# Patient Record
Sex: MALE | Race: WHITE
[De-identification: names, ages, dates, MRNs, and addresses within clinical notes are randomized per-mention and may not be internally consistent; named-entity substitution may affect disease eponyms.]

---

## 2021-02-04 ENCOUNTER — HOSPITAL ENCOUNTER (INPATIENT)
Dept: HOSPITAL 12 - ER | Age: 86
LOS: 12 days | Discharge: NURSING, REHAB OR LONG-TERM CARE | DRG: 885 | End: 2021-02-16
Payer: MEDICARE

## 2021-02-04 VITALS — DIASTOLIC BLOOD PRESSURE: 71 MMHG | SYSTOLIC BLOOD PRESSURE: 174 MMHG

## 2021-02-04 VITALS — DIASTOLIC BLOOD PRESSURE: 84 MMHG | SYSTOLIC BLOOD PRESSURE: 167 MMHG

## 2021-02-04 VITALS — WEIGHT: 156 LBS | HEIGHT: 67 IN | BODY MASS INDEX: 24.48 KG/M2

## 2021-02-04 DIAGNOSIS — F02.81: ICD-10-CM

## 2021-02-04 DIAGNOSIS — F01.50: ICD-10-CM

## 2021-02-04 DIAGNOSIS — E11.22: ICD-10-CM

## 2021-02-04 DIAGNOSIS — N13.9: ICD-10-CM

## 2021-02-04 DIAGNOSIS — Z79.4: ICD-10-CM

## 2021-02-04 DIAGNOSIS — N40.1: ICD-10-CM

## 2021-02-04 DIAGNOSIS — N18.9: ICD-10-CM

## 2021-02-04 DIAGNOSIS — E11.65: ICD-10-CM
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
## 2021-02-04 DIAGNOSIS — Z20.822: ICD-10-CM

## 2021-02-04 DIAGNOSIS — G31.09: ICD-10-CM

## 2021-02-04 DIAGNOSIS — G93.41: ICD-10-CM

## 2021-02-04 DIAGNOSIS — Z73.6: ICD-10-CM

## 2021-02-04 DIAGNOSIS — N39.0: ICD-10-CM

## 2021-02-04 DIAGNOSIS — M89.9: ICD-10-CM

## 2021-02-04 DIAGNOSIS — F23: ICD-10-CM

## 2021-02-04 DIAGNOSIS — G93.40: ICD-10-CM

## 2021-02-04 DIAGNOSIS — N17.0: ICD-10-CM

## 2021-02-04 DIAGNOSIS — F33.3: Primary | ICD-10-CM

## 2021-02-04 DIAGNOSIS — I12.9: ICD-10-CM

## 2021-02-04 DIAGNOSIS — F29: ICD-10-CM

## 2021-02-04 DIAGNOSIS — E78.5: ICD-10-CM

## 2021-02-04 DIAGNOSIS — Z79.890: ICD-10-CM

## 2021-02-04 DIAGNOSIS — S61.205D: ICD-10-CM

## 2021-02-04 DIAGNOSIS — E11.40: ICD-10-CM

## 2021-02-04 DIAGNOSIS — D64.9: ICD-10-CM

## 2021-02-04 DIAGNOSIS — E03.9: ICD-10-CM

## 2021-02-04 RX ADMIN — LORAZEPAM PRN MG: 0.5 TABLET ORAL at 20:19

## 2021-02-04 RX ADMIN — ATORVASTATIN CALCIUM SCH MG: 40 TABLET, FILM COATED ORAL at 20:19

## 2021-02-05 VITALS — DIASTOLIC BLOOD PRESSURE: 56 MMHG | SYSTOLIC BLOOD PRESSURE: 125 MMHG

## 2021-02-05 VITALS — SYSTOLIC BLOOD PRESSURE: 121 MMHG | DIASTOLIC BLOOD PRESSURE: 56 MMHG

## 2021-02-05 VITALS — DIASTOLIC BLOOD PRESSURE: 50 MMHG | SYSTOLIC BLOOD PRESSURE: 147 MMHG

## 2021-02-05 LAB
ALP SERPL-CCNC: 55 U/L (ref 50–136)
ALT SERPL W/O P-5'-P-CCNC: 26 U/L (ref 16–63)
AST SERPL-CCNC: 20 U/L (ref 15–37)
BILIRUB SERPL-MCNC: 0.5 MG/DL (ref 0.2–1)
BUN SERPL-MCNC: 49 MG/DL (ref 7–18)
CHLORIDE SERPL-SCNC: 107 MMOL/L (ref 98–107)
CHOLEST SERPL-MCNC: 99 MG/DL (ref ?–200)
CO2 SERPL-SCNC: 27 MMOL/L (ref 21–32)
CREAT SERPL-MCNC: 2.4 MG/DL (ref 0.6–1.3)
GLUCOSE SERPL-MCNC: 156 MG/DL (ref 74–106)
HDLC SERPL-MCNC: 44 MG/DL (ref 40–60)
POTASSIUM SERPL-SCNC: 3.6 MMOL/L (ref 3.5–5.1)
TRIGL SERPL-MCNC: 159 MG/DL (ref 30–150)
WS STN SPEC: 6.5 G/DL (ref 6.4–8.2)

## 2021-02-05 RX ADMIN — ATORVASTATIN CALCIUM SCH MG: 40 TABLET, FILM COATED ORAL at 20:13

## 2021-02-05 RX ADMIN — LEVOTHYROXINE SODIUM SCH MCG: 25 TABLET ORAL at 08:25

## 2021-02-05 RX ADMIN — TAMSULOSIN HYDROCHLORIDE SCH MG: 0.4 CAPSULE ORAL at 20:13

## 2021-02-05 RX ADMIN — FUROSEMIDE SCH MG: 40 TABLET ORAL at 08:24

## 2021-02-05 RX ADMIN — TRAZODONE HYDROCHLORIDE SCH MG: 50 TABLET ORAL at 20:13

## 2021-02-05 RX ADMIN — MEMANTINE HYDROCHLORIDE SCH MG: 10 TABLET ORAL at 08:24

## 2021-02-05 RX ADMIN — GABAPENTIN SCH MG: 400 CAPSULE ORAL at 08:25

## 2021-02-05 RX ADMIN — GABAPENTIN SCH MG: 400 CAPSULE ORAL at 17:00

## 2021-02-05 RX ADMIN — METOPROLOL SUCCINATE SCH MG: 50 TABLET, FILM COATED, EXTENDED RELEASE ORAL at 08:26

## 2021-02-06 VITALS — SYSTOLIC BLOOD PRESSURE: 158 MMHG | DIASTOLIC BLOOD PRESSURE: 74 MMHG

## 2021-02-06 VITALS — DIASTOLIC BLOOD PRESSURE: 48 MMHG | SYSTOLIC BLOOD PRESSURE: 126 MMHG

## 2021-02-06 VITALS — SYSTOLIC BLOOD PRESSURE: 114 MMHG | DIASTOLIC BLOOD PRESSURE: 46 MMHG

## 2021-02-06 RX ADMIN — MEMANTINE HYDROCHLORIDE SCH MG: 10 TABLET ORAL at 08:55

## 2021-02-06 RX ADMIN — TRAZODONE HYDROCHLORIDE SCH MG: 50 TABLET ORAL at 20:02

## 2021-02-06 RX ADMIN — TAMSULOSIN HYDROCHLORIDE SCH MG: 0.4 CAPSULE ORAL at 20:02

## 2021-02-06 RX ADMIN — GABAPENTIN SCH MG: 400 CAPSULE ORAL at 08:51

## 2021-02-06 RX ADMIN — CLOPIDOGREL BISULFATE SCH MG: 75 TABLET ORAL at 12:37

## 2021-02-06 RX ADMIN — FUROSEMIDE SCH MG: 40 TABLET ORAL at 08:51

## 2021-02-06 RX ADMIN — LORAZEPAM PRN MG: 0.5 TABLET ORAL at 20:02

## 2021-02-06 RX ADMIN — DONEPEZIL HYDROCHLORIDE SCH MG: 10 TABLET, FILM COATED ORAL at 12:37

## 2021-02-06 RX ADMIN — GABAPENTIN SCH MG: 400 CAPSULE ORAL at 16:55

## 2021-02-06 RX ADMIN — LEVOTHYROXINE SODIUM SCH MCG: 25 TABLET ORAL at 10:05

## 2021-02-06 RX ADMIN — METOPROLOL SUCCINATE SCH MG: 50 TABLET, FILM COATED, EXTENDED RELEASE ORAL at 08:51

## 2021-02-06 RX ADMIN — ATORVASTATIN CALCIUM SCH MG: 40 TABLET, FILM COATED ORAL at 20:01

## 2021-02-06 RX ADMIN — LORAZEPAM PRN MG: 0.5 TABLET ORAL at 12:37

## 2021-02-07 VITALS — SYSTOLIC BLOOD PRESSURE: 156 MMHG | DIASTOLIC BLOOD PRESSURE: 59 MMHG

## 2021-02-07 VITALS — DIASTOLIC BLOOD PRESSURE: 46 MMHG | SYSTOLIC BLOOD PRESSURE: 144 MMHG

## 2021-02-07 VITALS — DIASTOLIC BLOOD PRESSURE: 53 MMHG | SYSTOLIC BLOOD PRESSURE: 153 MMHG

## 2021-02-07 LAB
ALP SERPL-CCNC: 50 U/L (ref 50–136)
ALT SERPL W/O P-5'-P-CCNC: 19 U/L (ref 16–63)
APPEARANCE UR: CLEAR
AST SERPL-CCNC: 15 U/L (ref 15–37)
BASOPHILS # BLD AUTO: 0 K/UL (ref 0–8)
BASOPHILS NFR BLD AUTO: 0.3 % (ref 0–2)
BILIRUB SERPL-MCNC: 0.4 MG/DL (ref 0.2–1)
BILIRUB UR QL STRIP: NEGATIVE
BUN SERPL-MCNC: 71 MG/DL (ref 7–18)
CHLORIDE SERPL-SCNC: 104 MMOL/L (ref 98–107)
CO2 SERPL-SCNC: 24 MMOL/L (ref 21–32)
COLOR UR: YELLOW
CREAT SERPL-MCNC: 3.6 MG/DL (ref 0.6–1.3)
CREAT UR-MCNC: 113.1 MG/DL (ref 30–125)
EOSINOPHIL # BLD AUTO: 0.1 K/UL (ref 0–0.7)
EOSINOPHIL NFR BLD AUTO: 2.4 % (ref 0–7)
GLUCOSE SERPL-MCNC: 192 MG/DL (ref 74–106)
GLUCOSE UR STRIP-MCNC: NEGATIVE MG/DL
HCT VFR BLD AUTO: 27.9 % (ref 36.7–47.1)
HGB BLD-MCNC: 9.7 G/DL (ref 12.5–16.3)
HGB UR QL STRIP: NEGATIVE
KETONES UR STRIP-MCNC: NEGATIVE MG/DL
LEUKOCYTE ESTERASE UR QL STRIP: NEGATIVE
LYMPHOCYTES # BLD AUTO: 1 K/UL (ref 20–40)
LYMPHOCYTES NFR BLD AUTO: 20.1 % (ref 20.5–51.5)
MAGNESIUM SERPL-MCNC: 2.3 MG/DL (ref 1.8–2.4)
MCH RBC QN AUTO: 36 UUG (ref 23.8–33.4)
MCHC RBC AUTO-ENTMCNC: 35 G/DL (ref 32.5–36.3)
MCV RBC AUTO: 103.9 FL (ref 73–96.2)
MONOCYTES # BLD AUTO: 0.5 K/UL (ref 2–10)
MONOCYTES NFR BLD AUTO: 10 % (ref 0–11)
NEUTROPHILS # BLD AUTO: 3.5 K/UL (ref 1.8–8.9)
NEUTROPHILS NFR BLD AUTO: 67.2 % (ref 38.5–71.5)
NITRITE UR QL STRIP: NEGATIVE
PH UR STRIP: 5 [PH] (ref 5–8)
PHOSPHATE SERPL-MCNC: 4.5 MG/DL (ref 2.5–4.9)
PLATELET # BLD AUTO: 141 K/UL (ref 152–348)
POTASSIUM SERPL-SCNC: 4.9 MMOL/L (ref 3.5–5.1)
PROT UR-MCNC: 60.5 MG/DL
RBC # BLD AUTO: 2.68 MIL/UL (ref 4.06–5.63)
SP GR UR STRIP: 1.02 (ref 1–1.03)
UROBILINOGEN UR STRIP-MCNC: 0.2 E.U./DL
WBC # BLD AUTO: 5.2 K/UL (ref 3.6–10.2)
WS STN SPEC: 5.8 G/DL (ref 6.4–8.2)

## 2021-02-07 RX ADMIN — Medication SCH EACH: at 11:30

## 2021-02-07 RX ADMIN — ATORVASTATIN CALCIUM SCH MG: 40 TABLET, FILM COATED ORAL at 20:40

## 2021-02-07 RX ADMIN — TRAZODONE HYDROCHLORIDE SCH MG: 50 TABLET ORAL at 20:40

## 2021-02-07 RX ADMIN — Medication SCH EACH: at 20:07

## 2021-02-07 RX ADMIN — GABAPENTIN SCH MG: 400 CAPSULE ORAL at 17:29

## 2021-02-07 RX ADMIN — ANORECTAL OINTMENT SCH GM: 15.7; .44; 24; 20.6 OINTMENT TOPICAL at 11:59

## 2021-02-07 RX ADMIN — GABAPENTIN SCH MG: 400 CAPSULE ORAL at 11:55

## 2021-02-07 RX ADMIN — INSULIN GLARGINE SCH UNITS: 100 INJECTION, SOLUTION SUBCUTANEOUS at 20:10

## 2021-02-07 RX ADMIN — FUROSEMIDE SCH MG: 40 TABLET ORAL at 11:53

## 2021-02-07 RX ADMIN — SODIUM CHLORIDE PRN UNIT: 9 INJECTION, SOLUTION INTRAVENOUS at 20:12

## 2021-02-07 RX ADMIN — LEVOTHYROXINE SODIUM SCH MCG: 25 TABLET ORAL at 11:58

## 2021-02-07 RX ADMIN — Medication SCH EACH: at 16:37

## 2021-02-07 RX ADMIN — METOPROLOL SUCCINATE SCH MG: 50 TABLET, FILM COATED, EXTENDED RELEASE ORAL at 11:55

## 2021-02-07 RX ADMIN — ANORECTAL OINTMENT SCH GM: 15.7; .44; 24; 20.6 OINTMENT TOPICAL at 20:40

## 2021-02-07 RX ADMIN — CLOPIDOGREL BISULFATE SCH MG: 75 TABLET ORAL at 11:56

## 2021-02-07 RX ADMIN — TAMSULOSIN HYDROCHLORIDE SCH MG: 0.4 CAPSULE ORAL at 20:40

## 2021-02-07 RX ADMIN — MEMANTINE HYDROCHLORIDE SCH MG: 5 TABLET ORAL at 17:29

## 2021-02-07 RX ADMIN — MEMANTINE HYDROCHLORIDE SCH MG: 5 TABLET ORAL at 11:57

## 2021-02-07 RX ADMIN — DONEPEZIL HYDROCHLORIDE SCH MG: 10 TABLET, FILM COATED ORAL at 11:54

## 2021-02-07 RX ADMIN — LORAZEPAM PRN MG: 0.5 TABLET ORAL at 20:42

## 2021-02-07 RX ADMIN — SODIUM CHLORIDE PRN UNIT: 9 INJECTION, SOLUTION INTRAVENOUS at 16:35

## 2021-02-07 RX ADMIN — Medication SCH EACH: at 09:30

## 2021-02-08 VITALS — DIASTOLIC BLOOD PRESSURE: 50 MMHG | SYSTOLIC BLOOD PRESSURE: 149 MMHG

## 2021-02-08 VITALS — SYSTOLIC BLOOD PRESSURE: 159 MMHG | DIASTOLIC BLOOD PRESSURE: 46 MMHG

## 2021-02-08 VITALS — SYSTOLIC BLOOD PRESSURE: 147 MMHG | DIASTOLIC BLOOD PRESSURE: 62 MMHG

## 2021-02-08 RX ADMIN — TAMSULOSIN HYDROCHLORIDE SCH MG: 0.4 CAPSULE ORAL at 20:08

## 2021-02-08 RX ADMIN — LEVOTHYROXINE SODIUM SCH MCG: 25 TABLET ORAL at 09:44

## 2021-02-08 RX ADMIN — TRAZODONE HYDROCHLORIDE SCH MG: 50 TABLET ORAL at 20:08

## 2021-02-08 RX ADMIN — METOPROLOL SUCCINATE SCH MG: 50 TABLET, FILM COATED, EXTENDED RELEASE ORAL at 09:44

## 2021-02-08 RX ADMIN — ANORECTAL OINTMENT SCH GM: 15.7; .44; 24; 20.6 OINTMENT TOPICAL at 09:45

## 2021-02-08 RX ADMIN — FUROSEMIDE SCH MG: 40 TABLET ORAL at 09:44

## 2021-02-08 RX ADMIN — Medication SCH EACH: at 20:12

## 2021-02-08 RX ADMIN — MEMANTINE HYDROCHLORIDE SCH MG: 5 TABLET ORAL at 09:44

## 2021-02-08 RX ADMIN — SODIUM CHLORIDE PRN UNIT: 9 INJECTION, SOLUTION INTRAVENOUS at 20:16

## 2021-02-08 RX ADMIN — Medication SCH EACH: at 11:59

## 2021-02-08 RX ADMIN — INSULIN GLARGINE SCH UNITS: 100 INJECTION, SOLUTION SUBCUTANEOUS at 20:17

## 2021-02-08 RX ADMIN — SODIUM CHLORIDE PRN UNIT: 9 INJECTION, SOLUTION INTRAVENOUS at 17:08

## 2021-02-08 RX ADMIN — Medication SCH EACH: at 06:07

## 2021-02-08 RX ADMIN — ATORVASTATIN CALCIUM SCH MG: 40 TABLET, FILM COATED ORAL at 20:12

## 2021-02-08 RX ADMIN — Medication SCH EACH: at 16:41

## 2021-02-08 RX ADMIN — LORAZEPAM PRN MG: 0.5 TABLET ORAL at 21:51

## 2021-02-08 RX ADMIN — MEMANTINE HYDROCHLORIDE SCH MG: 5 TABLET ORAL at 17:07

## 2021-02-08 RX ADMIN — SODIUM CHLORIDE PRN UNIT: 9 INJECTION, SOLUTION INTRAVENOUS at 12:18

## 2021-02-08 RX ADMIN — ANORECTAL OINTMENT SCH GM: 15.7; .44; 24; 20.6 OINTMENT TOPICAL at 20:08

## 2021-02-08 RX ADMIN — DONEPEZIL HYDROCHLORIDE SCH MG: 10 TABLET, FILM COATED ORAL at 09:44

## 2021-02-08 RX ADMIN — GABAPENTIN SCH MG: 400 CAPSULE ORAL at 17:07

## 2021-02-08 RX ADMIN — CLOPIDOGREL BISULFATE SCH MG: 75 TABLET ORAL at 09:44

## 2021-02-08 RX ADMIN — GABAPENTIN SCH MG: 400 CAPSULE ORAL at 09:44

## 2021-02-09 VITALS — DIASTOLIC BLOOD PRESSURE: 52 MMHG | SYSTOLIC BLOOD PRESSURE: 119 MMHG

## 2021-02-09 VITALS — DIASTOLIC BLOOD PRESSURE: 48 MMHG | SYSTOLIC BLOOD PRESSURE: 122 MMHG

## 2021-02-09 LAB
BASOPHILS # BLD AUTO: 0 K/UL (ref 0–8)
BASOPHILS NFR BLD AUTO: 0.4 % (ref 0–2)
BUN SERPL-MCNC: 88 MG/DL (ref 7–18)
CREAT SERPL-MCNC: 3.4 MG/DL (ref 0.6–1.3)
EOSINOPHIL # BLD AUTO: 0.1 K/UL (ref 0–0.7)
EOSINOPHIL NFR BLD AUTO: 2.8 % (ref 0–7)
FERRITIN SERPL-MCNC: 211 NG/ML (ref 26–388)
HCT VFR BLD AUTO: 29.6 % (ref 36.7–47.1)
HGB BLD-MCNC: 10.1 G/DL (ref 12.5–16.3)
LDH SERPL L TO P-CCNC: 197 U/L (ref 85–227)
LYMPHOCYTES # BLD AUTO: 1 K/UL (ref 20–40)
LYMPHOCYTES NFR BLD AUTO: 20.1 % (ref 20.5–51.5)
MCH RBC QN AUTO: 35.3 UUG (ref 23.8–33.4)
MCHC RBC AUTO-ENTMCNC: 34 G/DL (ref 32.5–36.3)
MCV RBC AUTO: 102.9 FL (ref 73–96.2)
MONOCYTES # BLD AUTO: 0.5 K/UL (ref 2–10)
MONOCYTES NFR BLD AUTO: 9.8 % (ref 0–11)
NEUTROPHILS # BLD AUTO: 3.4 K/UL (ref 1.8–8.9)
NEUTROPHILS NFR BLD AUTO: 66.9 % (ref 38.5–71.5)
PLATELET # BLD AUTO: 154 K/UL (ref 152–348)
RBC # BLD AUTO: 2.88 MIL/UL (ref 4.06–5.63)
TSH SERPL DL<=0.005 MIU/L-ACNC: 1.75 MIU/ML (ref 0.36–3.74)
WBC # BLD AUTO: 5 K/UL (ref 3.6–10.2)

## 2021-02-09 RX ADMIN — DONEPEZIL HYDROCHLORIDE SCH MG: 10 TABLET, FILM COATED ORAL at 09:41

## 2021-02-09 RX ADMIN — ANORECTAL OINTMENT SCH GM: 15.7; .44; 24; 20.6 OINTMENT TOPICAL at 09:45

## 2021-02-09 RX ADMIN — CLOPIDOGREL BISULFATE SCH MG: 75 TABLET ORAL at 09:42

## 2021-02-09 RX ADMIN — GABAPENTIN SCH MG: 400 CAPSULE ORAL at 17:15

## 2021-02-09 RX ADMIN — Medication SCH EACH: at 20:36

## 2021-02-09 RX ADMIN — SODIUM CHLORIDE PRN UNIT: 9 INJECTION, SOLUTION INTRAVENOUS at 08:12

## 2021-02-09 RX ADMIN — Medication SCH EACH: at 11:58

## 2021-02-09 RX ADMIN — ANORECTAL OINTMENT SCH GM: 15.7; .44; 24; 20.6 OINTMENT TOPICAL at 20:37

## 2021-02-09 RX ADMIN — SODIUM CHLORIDE PRN UNIT: 9 INJECTION, SOLUTION INTRAVENOUS at 17:34

## 2021-02-09 RX ADMIN — Medication SCH EACH: at 16:49

## 2021-02-09 RX ADMIN — MEMANTINE HYDROCHLORIDE SCH MG: 5 TABLET ORAL at 17:15

## 2021-02-09 RX ADMIN — GABAPENTIN SCH MG: 400 CAPSULE ORAL at 09:41

## 2021-02-09 RX ADMIN — MEMANTINE HYDROCHLORIDE SCH MG: 5 TABLET ORAL at 09:41

## 2021-02-09 RX ADMIN — INSULIN GLARGINE SCH UNITS: 100 INJECTION, SOLUTION SUBCUTANEOUS at 20:55

## 2021-02-09 RX ADMIN — LORAZEPAM PRN MG: 0.5 TABLET ORAL at 20:53

## 2021-02-09 RX ADMIN — METOPROLOL SUCCINATE SCH MG: 50 TABLET, FILM COATED, EXTENDED RELEASE ORAL at 09:42

## 2021-02-09 RX ADMIN — CLOTRIMAZOLE SCH GM: 1 CREAM TOPICAL at 17:15

## 2021-02-09 RX ADMIN — SODIUM CHLORIDE PRN UNIT: 9 INJECTION, SOLUTION INTRAVENOUS at 20:54

## 2021-02-09 RX ADMIN — TAMSULOSIN HYDROCHLORIDE SCH MG: 0.4 CAPSULE ORAL at 20:53

## 2021-02-09 RX ADMIN — Medication SCH EACH: at 06:32

## 2021-02-09 RX ADMIN — FUROSEMIDE SCH MG: 40 TABLET ORAL at 09:41

## 2021-02-09 RX ADMIN — TRAZODONE HYDROCHLORIDE SCH MG: 50 TABLET ORAL at 20:53

## 2021-02-09 RX ADMIN — LEVOTHYROXINE SODIUM SCH MCG: 25 TABLET ORAL at 09:43

## 2021-02-09 RX ADMIN — ATORVASTATIN CALCIUM SCH MG: 40 TABLET, FILM COATED ORAL at 20:53

## 2021-02-10 VITALS — DIASTOLIC BLOOD PRESSURE: 51 MMHG | SYSTOLIC BLOOD PRESSURE: 151 MMHG

## 2021-02-10 VITALS — SYSTOLIC BLOOD PRESSURE: 168 MMHG | DIASTOLIC BLOOD PRESSURE: 54 MMHG

## 2021-02-10 VITALS — DIASTOLIC BLOOD PRESSURE: 56 MMHG | SYSTOLIC BLOOD PRESSURE: 126 MMHG

## 2021-02-10 LAB
ALP SERPL-CCNC: 55 U/L (ref 50–136)
ALT SERPL W/O P-5'-P-CCNC: 23 U/L (ref 16–63)
AST SERPL-CCNC: 23 U/L (ref 15–37)
BASOPHILS # BLD AUTO: 0 K/UL (ref 0–8)
BASOPHILS NFR BLD AUTO: 0.3 % (ref 0–2)
BILIRUB SERPL-MCNC: 0.4 MG/DL (ref 0.2–1)
BUN SERPL-MCNC: 87 MG/DL (ref 7–18)
CHLORIDE SERPL-SCNC: 103 MMOL/L (ref 98–107)
CO2 SERPL-SCNC: 25 MMOL/L (ref 21–32)
CREAT SERPL-MCNC: 2.8 MG/DL (ref 0.6–1.3)
EOSINOPHIL # BLD AUTO: 0.1 K/UL (ref 0–0.7)
EOSINOPHIL NFR BLD AUTO: 1.7 % (ref 0–7)
GLUCOSE SERPL-MCNC: 197 MG/DL (ref 74–106)
HCT VFR BLD AUTO: 32.1 % (ref 36.7–47.1)
HGB BLD-MCNC: 10.8 G/DL (ref 12.5–16.3)
LYMPHOCYTES # BLD AUTO: 1.1 K/UL (ref 20–40)
LYMPHOCYTES NFR BLD AUTO: 19.7 % (ref 20.5–51.5)
MAGNESIUM SERPL-MCNC: 2.4 MG/DL (ref 1.8–2.4)
MCH RBC QN AUTO: 34.7 UUG (ref 23.8–33.4)
MCHC RBC AUTO-ENTMCNC: 34 G/DL (ref 32.5–36.3)
MCV RBC AUTO: 102.7 FL (ref 73–96.2)
MONOCYTES # BLD AUTO: 0.6 K/UL (ref 2–10)
MONOCYTES NFR BLD AUTO: 9.8 % (ref 0–11)
NEUTROPHILS # BLD AUTO: 3.9 K/UL (ref 1.8–8.9)
NEUTROPHILS NFR BLD AUTO: 68.5 % (ref 38.5–71.5)
PLATELET # BLD AUTO: 174 K/UL (ref 152–348)
POTASSIUM SERPL-SCNC: 4.5 MMOL/L (ref 3.5–5.1)
RBC # BLD AUTO: 3.13 MIL/UL (ref 4.06–5.63)
WBC # BLD AUTO: 5.7 K/UL (ref 3.6–10.2)
WS STN SPEC: 6.5 G/DL (ref 6.4–8.2)

## 2021-02-10 RX ADMIN — CLOTRIMAZOLE SCH GM: 1 CREAM TOPICAL at 17:47

## 2021-02-10 RX ADMIN — SODIUM CHLORIDE PRN UNIT: 9 INJECTION, SOLUTION INTRAVENOUS at 20:47

## 2021-02-10 RX ADMIN — SODIUM CHLORIDE PRN UNIT: 9 INJECTION, SOLUTION INTRAVENOUS at 18:01

## 2021-02-10 RX ADMIN — GABAPENTIN SCH MG: 400 CAPSULE ORAL at 09:00

## 2021-02-10 RX ADMIN — Medication SCH EACH: at 06:43

## 2021-02-10 RX ADMIN — Medication SCH EACH: at 11:43

## 2021-02-10 RX ADMIN — Medication SCH EACH: at 20:42

## 2021-02-10 RX ADMIN — MEMANTINE HYDROCHLORIDE SCH MG: 5 TABLET ORAL at 09:00

## 2021-02-10 RX ADMIN — ANORECTAL OINTMENT SCH GM: 15.7; .44; 24; 20.6 OINTMENT TOPICAL at 10:01

## 2021-02-10 RX ADMIN — FUROSEMIDE SCH MG: 40 TABLET ORAL at 09:00

## 2021-02-10 RX ADMIN — Medication SCH EACH: at 16:52

## 2021-02-10 RX ADMIN — DONEPEZIL HYDROCHLORIDE SCH MG: 10 TABLET, FILM COATED ORAL at 09:00

## 2021-02-10 RX ADMIN — MEMANTINE HYDROCHLORIDE SCH MG: 5 TABLET ORAL at 17:24

## 2021-02-10 RX ADMIN — ANORECTAL OINTMENT SCH GM: 15.7; .44; 24; 20.6 OINTMENT TOPICAL at 20:42

## 2021-02-10 RX ADMIN — METOPROLOL SUCCINATE SCH MG: 50 TABLET, FILM COATED, EXTENDED RELEASE ORAL at 09:00

## 2021-02-10 RX ADMIN — ATORVASTATIN CALCIUM SCH MG: 40 TABLET, FILM COATED ORAL at 20:03

## 2021-02-10 RX ADMIN — LORAZEPAM PRN MG: 0.5 TABLET ORAL at 20:03

## 2021-02-10 RX ADMIN — CLOPIDOGREL BISULFATE SCH MG: 75 TABLET ORAL at 09:00

## 2021-02-10 RX ADMIN — DIVALPROEX SODIUM SCH MG: 125 CAPSULE ORAL at 17:24

## 2021-02-10 RX ADMIN — DIVALPROEX SODIUM SCH MG: 125 CAPSULE ORAL at 14:21

## 2021-02-10 RX ADMIN — INSULIN GLARGINE SCH UNITS: 100 INJECTION, SOLUTION SUBCUTANEOUS at 20:48

## 2021-02-10 RX ADMIN — TAMSULOSIN HYDROCHLORIDE SCH MG: 0.4 CAPSULE ORAL at 20:03

## 2021-02-10 RX ADMIN — LEVOTHYROXINE SODIUM SCH MCG: 25 TABLET ORAL at 09:00

## 2021-02-10 RX ADMIN — CLOTRIMAZOLE SCH GM: 1 CREAM TOPICAL at 10:01

## 2021-02-10 RX ADMIN — GABAPENTIN SCH MG: 300 CAPSULE ORAL at 20:03

## 2021-02-11 VITALS — SYSTOLIC BLOOD PRESSURE: 154 MMHG | DIASTOLIC BLOOD PRESSURE: 61 MMHG

## 2021-02-11 VITALS — DIASTOLIC BLOOD PRESSURE: 51 MMHG | SYSTOLIC BLOOD PRESSURE: 121 MMHG

## 2021-02-11 VITALS — SYSTOLIC BLOOD PRESSURE: 172 MMHG | DIASTOLIC BLOOD PRESSURE: 70 MMHG

## 2021-02-11 VITALS — SYSTOLIC BLOOD PRESSURE: 152 MMHG | DIASTOLIC BLOOD PRESSURE: 66 MMHG

## 2021-02-11 LAB
BASOPHILS # BLD AUTO: 0 K/UL (ref 0–8)
BASOPHILS NFR BLD AUTO: 0.4 % (ref 0–2)
BUN SERPL-MCNC: 79 MG/DL (ref 7–18)
CHLORIDE SERPL-SCNC: 106 MMOL/L (ref 98–107)
CO2 SERPL-SCNC: 28 MMOL/L (ref 21–32)
CREAT SERPL-MCNC: 2.2 MG/DL (ref 0.6–1.3)
EOSINOPHIL # BLD AUTO: 0.1 K/UL (ref 0–0.7)
EOSINOPHIL NFR BLD AUTO: 1.9 % (ref 0–7)
GLUCOSE SERPL-MCNC: 117 MG/DL (ref 74–106)
HCT VFR BLD AUTO: 30.9 % (ref 36.7–47.1)
HGB BLD-MCNC: 10.8 G/DL (ref 12.5–16.3)
LYMPHOCYTES # BLD AUTO: 0.2 K/UL (ref 20–40)
LYMPHOCYTES NFR BLD AUTO: 2.9 % (ref 20.5–51.5)
MAGNESIUM SERPL-MCNC: 2.2 MG/DL (ref 1.8–2.4)
MCH RBC QN AUTO: 36 UUG (ref 23.8–33.4)
MCHC RBC AUTO-ENTMCNC: 35 G/DL (ref 32.5–36.3)
MCV RBC AUTO: 102.8 FL (ref 73–96.2)
MONOCYTES # BLD AUTO: 0.2 K/UL (ref 2–10)
MONOCYTES NFR BLD AUTO: 2.9 % (ref 0–11)
NEUTROPHILS # BLD AUTO: 4.8 K/UL (ref 1.8–8.9)
NEUTROPHILS NFR BLD AUTO: 91.9 % (ref 38.5–71.5)
PHOSPHATE SERPL-MCNC: 4.2 MG/DL (ref 2.5–4.9)
PLATELET # BLD AUTO: 180 K/UL (ref 152–348)
POTASSIUM SERPL-SCNC: 5.9 MMOL/L (ref 3.5–5.1)
RBC # BLD AUTO: 3 MIL/UL (ref 4.06–5.63)
WBC # BLD AUTO: 5.2 K/UL (ref 3.6–10.2)

## 2021-02-11 RX ADMIN — Medication SCH EACH: at 06:54

## 2021-02-11 RX ADMIN — MEMANTINE HYDROCHLORIDE SCH MG: 5 TABLET ORAL at 09:18

## 2021-02-11 RX ADMIN — DIVALPROEX SODIUM SCH MG: 125 CAPSULE ORAL at 13:46

## 2021-02-11 RX ADMIN — ATORVASTATIN CALCIUM SCH MG: 40 TABLET, FILM COATED ORAL at 09:18

## 2021-02-11 RX ADMIN — SODIUM CHLORIDE PRN UNIT: 9 INJECTION, SOLUTION INTRAVENOUS at 12:31

## 2021-02-11 RX ADMIN — ANORECTAL OINTMENT SCH GM: 15.7; .44; 24; 20.6 OINTMENT TOPICAL at 20:49

## 2021-02-11 RX ADMIN — CLOTRIMAZOLE SCH GM: 1 CREAM TOPICAL at 09:20

## 2021-02-11 RX ADMIN — INSULIN GLARGINE SCH UNITS: 100 INJECTION, SOLUTION SUBCUTANEOUS at 21:06

## 2021-02-11 RX ADMIN — DIVALPROEX SODIUM SCH MG: 125 CAPSULE ORAL at 09:18

## 2021-02-11 RX ADMIN — ANORECTAL OINTMENT SCH GM: 15.7; .44; 24; 20.6 OINTMENT TOPICAL at 09:20

## 2021-02-11 RX ADMIN — GABAPENTIN SCH MG: 100 CAPSULE ORAL at 17:10

## 2021-02-11 RX ADMIN — MEGESTROL ACETATE SCH MG: 20 TABLET ORAL at 17:10

## 2021-02-11 RX ADMIN — MEMANTINE HYDROCHLORIDE SCH MG: 5 TABLET ORAL at 17:10

## 2021-02-11 RX ADMIN — DONEPEZIL HYDROCHLORIDE SCH MG: 10 TABLET, FILM COATED ORAL at 09:17

## 2021-02-11 RX ADMIN — Medication SCH EACH: at 12:03

## 2021-02-11 RX ADMIN — Medication SCH EA: at 15:10

## 2021-02-11 RX ADMIN — Medication SCH EACH: at 21:02

## 2021-02-11 RX ADMIN — DIVALPROEX SODIUM SCH MG: 125 CAPSULE ORAL at 17:11

## 2021-02-11 RX ADMIN — TAMSULOSIN HYDROCHLORIDE SCH MG: 0.4 CAPSULE ORAL at 20:22

## 2021-02-11 RX ADMIN — GABAPENTIN SCH MG: 300 CAPSULE ORAL at 20:27

## 2021-02-11 RX ADMIN — SODIUM CHLORIDE PRN UNIT: 9 INJECTION, SOLUTION INTRAVENOUS at 21:04

## 2021-02-11 RX ADMIN — Medication SCH EACH: at 17:11

## 2021-02-11 RX ADMIN — CLOTRIMAZOLE SCH GM: 1 CREAM TOPICAL at 18:25

## 2021-02-11 RX ADMIN — FUROSEMIDE SCH MG: 40 TABLET ORAL at 09:18

## 2021-02-11 RX ADMIN — CLOPIDOGREL BISULFATE SCH MG: 75 TABLET ORAL at 09:18

## 2021-02-11 RX ADMIN — LEVOTHYROXINE SODIUM SCH MCG: 25 TABLET ORAL at 09:18

## 2021-02-11 RX ADMIN — GABAPENTIN SCH MG: 100 CAPSULE ORAL at 09:23

## 2021-02-11 RX ADMIN — SODIUM CHLORIDE PRN UNIT: 9 INJECTION, SOLUTION INTRAVENOUS at 18:28

## 2021-02-11 RX ADMIN — METOPROLOL SUCCINATE SCH MG: 50 TABLET, FILM COATED, EXTENDED RELEASE ORAL at 09:17

## 2021-02-12 VITALS — SYSTOLIC BLOOD PRESSURE: 119 MMHG | DIASTOLIC BLOOD PRESSURE: 49 MMHG

## 2021-02-12 VITALS — DIASTOLIC BLOOD PRESSURE: 57 MMHG | SYSTOLIC BLOOD PRESSURE: 135 MMHG

## 2021-02-12 VITALS — DIASTOLIC BLOOD PRESSURE: 60 MMHG | SYSTOLIC BLOOD PRESSURE: 114 MMHG

## 2021-02-12 LAB
BASOPHILS # BLD AUTO: 0 K/UL (ref 0–8)
BASOPHILS NFR BLD AUTO: 0.2 % (ref 0–2)
BUN SERPL-MCNC: 77 MG/DL (ref 7–18)
CHLORIDE SERPL-SCNC: 104 MMOL/L (ref 98–107)
CO2 SERPL-SCNC: 27 MMOL/L (ref 21–32)
CREAT SERPL-MCNC: 2.2 MG/DL (ref 0.6–1.3)
EOSINOPHIL # BLD AUTO: 0.1 K/UL (ref 0–0.7)
EOSINOPHIL NFR BLD AUTO: 1.4 % (ref 0–7)
GLUCOSE SERPL-MCNC: 244 MG/DL (ref 74–106)
HCT VFR BLD AUTO: 29.2 % (ref 36.7–47.1)
HGB BLD-MCNC: 9.9 G/DL (ref 12.5–16.3)
LYMPHOCYTES # BLD AUTO: 0.4 K/UL (ref 20–40)
LYMPHOCYTES NFR BLD AUTO: 7.7 % (ref 20.5–51.5)
MAGNESIUM SERPL-MCNC: 2.1 MG/DL (ref 1.8–2.4)
MCH RBC QN AUTO: 34.8 UUG (ref 23.8–33.4)
MCHC RBC AUTO-ENTMCNC: 34 G/DL (ref 32.5–36.3)
MCV RBC AUTO: 102.9 FL (ref 73–96.2)
MONOCYTES # BLD AUTO: 0.4 K/UL (ref 2–10)
MONOCYTES NFR BLD AUTO: 8.2 % (ref 0–11)
NEUTROPHILS # BLD AUTO: 3.8 K/UL (ref 1.8–8.9)
NEUTROPHILS NFR BLD AUTO: 82.5 % (ref 38.5–71.5)
PHOSPHATE SERPL-MCNC: 3.4 MG/DL (ref 2.5–4.9)
PLATELET # BLD AUTO: 170 K/UL (ref 152–348)
POTASSIUM SERPL-SCNC: 4.9 MMOL/L (ref 3.5–5.1)
RBC # BLD AUTO: 2.83 MIL/UL (ref 4.06–5.63)
WBC # BLD AUTO: 4.7 K/UL (ref 3.6–10.2)

## 2021-02-12 RX ADMIN — TAMSULOSIN HYDROCHLORIDE SCH MG: 0.4 CAPSULE ORAL at 20:42

## 2021-02-12 RX ADMIN — CLOTRIMAZOLE SCH GM: 1 CREAM TOPICAL at 17:36

## 2021-02-12 RX ADMIN — Medication SCH EACH: at 21:03

## 2021-02-12 RX ADMIN — DIVALPROEX SODIUM SCH MG: 125 CAPSULE ORAL at 15:24

## 2021-02-12 RX ADMIN — DIVALPROEX SODIUM SCH MG: 125 CAPSULE ORAL at 09:01

## 2021-02-12 RX ADMIN — GABAPENTIN SCH MG: 100 CAPSULE ORAL at 17:36

## 2021-02-12 RX ADMIN — ANORECTAL OINTMENT SCH GM: 15.7; .44; 24; 20.6 OINTMENT TOPICAL at 20:46

## 2021-02-12 RX ADMIN — MEMANTINE HYDROCHLORIDE SCH MG: 5 TABLET ORAL at 17:36

## 2021-02-12 RX ADMIN — LEVOTHYROXINE SODIUM SCH MCG: 25 TABLET ORAL at 06:30

## 2021-02-12 RX ADMIN — GABAPENTIN SCH MG: 300 CAPSULE ORAL at 20:42

## 2021-02-12 RX ADMIN — ANORECTAL OINTMENT SCH GM: 15.7; .44; 24; 20.6 OINTMENT TOPICAL at 09:04

## 2021-02-12 RX ADMIN — DIVALPROEX SODIUM SCH MG: 125 CAPSULE ORAL at 17:36

## 2021-02-12 RX ADMIN — Medication SCH EA: at 09:04

## 2021-02-12 RX ADMIN — MEMANTINE HYDROCHLORIDE SCH MG: 5 TABLET ORAL at 09:03

## 2021-02-12 RX ADMIN — Medication SCH EACH: at 06:34

## 2021-02-12 RX ADMIN — FUROSEMIDE SCH MG: 40 TABLET ORAL at 09:03

## 2021-02-12 RX ADMIN — MEGESTROL ACETATE SCH MG: 20 TABLET ORAL at 09:01

## 2021-02-12 RX ADMIN — INSULIN GLARGINE SCH UNITS: 100 INJECTION, SOLUTION SUBCUTANEOUS at 20:43

## 2021-02-12 RX ADMIN — Medication SCH EACH: at 12:20

## 2021-02-12 RX ADMIN — ATORVASTATIN CALCIUM SCH MG: 40 TABLET, FILM COATED ORAL at 20:41

## 2021-02-12 RX ADMIN — CLOPIDOGREL BISULFATE SCH MG: 75 TABLET ORAL at 09:03

## 2021-02-12 RX ADMIN — METOPROLOL SUCCINATE SCH MG: 50 TABLET, FILM COATED, EXTENDED RELEASE ORAL at 09:02

## 2021-02-12 RX ADMIN — CLOTRIMAZOLE SCH GM: 1 CREAM TOPICAL at 09:04

## 2021-02-12 RX ADMIN — DONEPEZIL HYDROCHLORIDE SCH MG: 10 TABLET, FILM COATED ORAL at 09:01

## 2021-02-12 RX ADMIN — SODIUM CHLORIDE PRN UNIT: 9 INJECTION, SOLUTION INTRAVENOUS at 20:45

## 2021-02-12 RX ADMIN — GABAPENTIN SCH MG: 100 CAPSULE ORAL at 09:03

## 2021-02-12 RX ADMIN — MEGESTROL ACETATE SCH MG: 20 TABLET ORAL at 18:16

## 2021-02-12 RX ADMIN — Medication SCH EACH: at 16:42

## 2021-02-13 VITALS — SYSTOLIC BLOOD PRESSURE: 138 MMHG | DIASTOLIC BLOOD PRESSURE: 46 MMHG

## 2021-02-13 VITALS — DIASTOLIC BLOOD PRESSURE: 51 MMHG | SYSTOLIC BLOOD PRESSURE: 121 MMHG

## 2021-02-13 VITALS — SYSTOLIC BLOOD PRESSURE: 129 MMHG | DIASTOLIC BLOOD PRESSURE: 57 MMHG

## 2021-02-13 LAB
BASOPHILS # BLD AUTO: 0 K/UL (ref 0–8)
BASOPHILS NFR BLD AUTO: 0.6 % (ref 0–2)
BUN SERPL-MCNC: 82 MG/DL (ref 7–18)
CHLORIDE SERPL-SCNC: 106 MMOL/L (ref 98–107)
CO2 SERPL-SCNC: 27 MMOL/L (ref 21–32)
CREAT SERPL-MCNC: 2.3 MG/DL (ref 0.6–1.3)
EOSINOPHIL # BLD AUTO: 0.1 K/UL (ref 0–0.7)
EOSINOPHIL NFR BLD AUTO: 2.1 % (ref 0–7)
GLUCOSE SERPL-MCNC: 82 MG/DL (ref 74–106)
HCT VFR BLD AUTO: 25.7 % (ref 36.7–47.1)
HGB BLD-MCNC: 8.9 G/DL (ref 12.5–16.3)
LYMPHOCYTES # BLD AUTO: 0.8 K/UL (ref 20–40)
LYMPHOCYTES NFR BLD AUTO: 17.6 % (ref 20.5–51.5)
MAGNESIUM SERPL-MCNC: 2 MG/DL (ref 1.8–2.4)
MCH RBC QN AUTO: 35.7 UUG (ref 23.8–33.4)
MCHC RBC AUTO-ENTMCNC: 35 G/DL (ref 32.5–36.3)
MCV RBC AUTO: 102.7 FL (ref 73–96.2)
MONOCYTES # BLD AUTO: 0.5 K/UL (ref 2–10)
MONOCYTES NFR BLD AUTO: 10.5 % (ref 0–11)
NEUTROPHILS # BLD AUTO: 3.2 K/UL (ref 1.8–8.9)
NEUTROPHILS NFR BLD AUTO: 69.2 % (ref 38.5–71.5)
PHOSPHATE SERPL-MCNC: 4.2 MG/DL (ref 2.5–4.9)
PLATELET # BLD AUTO: 151 K/UL (ref 152–348)
POTASSIUM SERPL-SCNC: 4.5 MMOL/L (ref 3.5–5.1)
RBC # BLD AUTO: 2.5 MIL/UL (ref 4.06–5.63)
WBC # BLD AUTO: 4.7 K/UL (ref 3.6–10.2)

## 2021-02-13 RX ADMIN — SODIUM CHLORIDE PRN UNIT: 9 INJECTION, SOLUTION INTRAVENOUS at 18:11

## 2021-02-13 RX ADMIN — MEGESTROL ACETATE SCH MG: 20 TABLET ORAL at 17:00

## 2021-02-13 RX ADMIN — LEVOTHYROXINE SODIUM SCH MCG: 25 TABLET ORAL at 06:18

## 2021-02-13 RX ADMIN — LORAZEPAM PRN MG: 0.5 TABLET ORAL at 20:32

## 2021-02-13 RX ADMIN — CLOTRIMAZOLE SCH GM: 1 CREAM TOPICAL at 18:08

## 2021-02-13 RX ADMIN — Medication SCH EACH: at 20:32

## 2021-02-13 RX ADMIN — ATORVASTATIN CALCIUM SCH MG: 40 TABLET, FILM COATED ORAL at 20:32

## 2021-02-13 RX ADMIN — TAMSULOSIN HYDROCHLORIDE SCH MG: 0.4 CAPSULE ORAL at 20:32

## 2021-02-13 RX ADMIN — GABAPENTIN SCH MG: 300 CAPSULE ORAL at 20:33

## 2021-02-13 RX ADMIN — SODIUM CHLORIDE PRN UNIT: 9 INJECTION, SOLUTION INTRAVENOUS at 12:27

## 2021-02-13 RX ADMIN — DONEPEZIL HYDROCHLORIDE SCH MG: 10 TABLET, FILM COATED ORAL at 10:03

## 2021-02-13 RX ADMIN — INSULIN GLARGINE SCH UNITS: 100 INJECTION, SOLUTION SUBCUTANEOUS at 20:36

## 2021-02-13 RX ADMIN — CLOTRIMAZOLE SCH GM: 1 CREAM TOPICAL at 10:04

## 2021-02-13 RX ADMIN — METOPROLOL SUCCINATE SCH MG: 50 TABLET, FILM COATED, EXTENDED RELEASE ORAL at 10:02

## 2021-02-13 RX ADMIN — FUROSEMIDE SCH MG: 40 TABLET ORAL at 10:03

## 2021-02-13 RX ADMIN — DIVALPROEX SODIUM SCH MG: 125 CAPSULE ORAL at 12:50

## 2021-02-13 RX ADMIN — MEMANTINE HYDROCHLORIDE SCH MG: 5 TABLET ORAL at 10:02

## 2021-02-13 RX ADMIN — ANORECTAL OINTMENT SCH GM: 15.7; .44; 24; 20.6 OINTMENT TOPICAL at 10:04

## 2021-02-13 RX ADMIN — GABAPENTIN SCH MG: 100 CAPSULE ORAL at 10:03

## 2021-02-13 RX ADMIN — MEGESTROL ACETATE SCH MG: 20 TABLET ORAL at 10:03

## 2021-02-13 RX ADMIN — Medication SCH EACH: at 18:09

## 2021-02-13 RX ADMIN — Medication SCH EACH: at 06:33

## 2021-02-13 RX ADMIN — GABAPENTIN SCH MG: 100 CAPSULE ORAL at 18:07

## 2021-02-13 RX ADMIN — CLOPIDOGREL BISULFATE SCH MG: 75 TABLET ORAL at 10:03

## 2021-02-13 RX ADMIN — DIVALPROEX SODIUM SCH MG: 125 CAPSULE ORAL at 10:01

## 2021-02-13 RX ADMIN — DIVALPROEX SODIUM SCH MG: 125 CAPSULE ORAL at 18:07

## 2021-02-13 RX ADMIN — Medication SCH EA: at 10:08

## 2021-02-13 RX ADMIN — ANORECTAL OINTMENT SCH GM: 15.7; .44; 24; 20.6 OINTMENT TOPICAL at 20:32

## 2021-02-13 RX ADMIN — SODIUM CHLORIDE PRN UNIT: 9 INJECTION, SOLUTION INTRAVENOUS at 20:37

## 2021-02-13 RX ADMIN — MEMANTINE HYDROCHLORIDE SCH MG: 5 TABLET ORAL at 18:08

## 2021-02-13 RX ADMIN — Medication SCH EACH: at 12:29

## 2021-02-14 VITALS — SYSTOLIC BLOOD PRESSURE: 131 MMHG | DIASTOLIC BLOOD PRESSURE: 59 MMHG

## 2021-02-14 VITALS — SYSTOLIC BLOOD PRESSURE: 136 MMHG | DIASTOLIC BLOOD PRESSURE: 56 MMHG

## 2021-02-14 VITALS — DIASTOLIC BLOOD PRESSURE: 55 MMHG | SYSTOLIC BLOOD PRESSURE: 132 MMHG

## 2021-02-14 LAB
BASOPHILS # BLD AUTO: 0 K/UL (ref 0–8)
BASOPHILS NFR BLD AUTO: 0.3 % (ref 0–2)
BUN SERPL-MCNC: 87 MG/DL (ref 7–18)
CHLORIDE SERPL-SCNC: 106 MMOL/L (ref 98–107)
CO2 SERPL-SCNC: 29 MMOL/L (ref 21–32)
CREAT SERPL-MCNC: 2.4 MG/DL (ref 0.6–1.3)
EOSINOPHIL # BLD AUTO: 0.1 K/UL (ref 0–0.7)
EOSINOPHIL NFR BLD AUTO: 0.6 % (ref 0–7)
GLUCOSE SERPL-MCNC: 151 MG/DL (ref 74–106)
HCT VFR BLD AUTO: 28.1 % (ref 36.7–47.1)
HGB BLD-MCNC: 9.7 G/DL (ref 12.5–16.3)
LYMPHOCYTES # BLD AUTO: 1.1 K/UL (ref 20–40)
LYMPHOCYTES NFR BLD AUTO: 12.3 % (ref 20.5–51.5)
MAGNESIUM SERPL-MCNC: 2.2 MG/DL (ref 1.8–2.4)
MCH RBC QN AUTO: 35.4 UUG (ref 23.8–33.4)
MCHC RBC AUTO-ENTMCNC: 34 G/DL (ref 32.5–36.3)
MCV RBC AUTO: 103 FL (ref 73–96.2)
MONOCYTES # BLD AUTO: 1 K/UL (ref 2–10)
MONOCYTES NFR BLD AUTO: 11.1 % (ref 0–11)
NEUTROPHILS # BLD AUTO: 6.6 K/UL (ref 1.8–8.9)
NEUTROPHILS NFR BLD AUTO: 75.7 % (ref 38.5–71.5)
PHOSPHATE SERPL-MCNC: 4.2 MG/DL (ref 2.5–4.9)
PLATELET # BLD AUTO: 164 K/UL (ref 152–348)
POTASSIUM SERPL-SCNC: 5.3 MMOL/L (ref 3.5–5.1)
RBC # BLD AUTO: 2.73 MIL/UL (ref 4.06–5.63)
WBC # BLD AUTO: 8.7 K/UL (ref 3.6–10.2)

## 2021-02-14 RX ADMIN — INSULIN GLARGINE SCH UNITS: 100 INJECTION, SOLUTION SUBCUTANEOUS at 21:30

## 2021-02-14 RX ADMIN — Medication SCH EACH: at 17:32

## 2021-02-14 RX ADMIN — DIVALPROEX SODIUM SCH MG: 125 CAPSULE ORAL at 13:06

## 2021-02-14 RX ADMIN — METOPROLOL SUCCINATE SCH MG: 50 TABLET, FILM COATED, EXTENDED RELEASE ORAL at 09:28

## 2021-02-14 RX ADMIN — ANORECTAL OINTMENT SCH GM: 15.7; .44; 24; 20.6 OINTMENT TOPICAL at 09:31

## 2021-02-14 RX ADMIN — CLOTRIMAZOLE SCH GM: 1 CREAM TOPICAL at 17:33

## 2021-02-14 RX ADMIN — FUROSEMIDE SCH MG: 40 TABLET ORAL at 09:28

## 2021-02-14 RX ADMIN — LORAZEPAM PRN MG: 0.5 TABLET ORAL at 02:28

## 2021-02-14 RX ADMIN — SODIUM CHLORIDE PRN UNIT: 9 INJECTION, SOLUTION INTRAVENOUS at 21:22

## 2021-02-14 RX ADMIN — MEGESTROL ACETATE SCH MG: 20 TABLET ORAL at 17:33

## 2021-02-14 RX ADMIN — Medication SCH EA: at 09:33

## 2021-02-14 RX ADMIN — ATORVASTATIN CALCIUM SCH MG: 40 TABLET, FILM COATED ORAL at 21:51

## 2021-02-14 RX ADMIN — GABAPENTIN SCH MG: 300 CAPSULE ORAL at 21:30

## 2021-02-14 RX ADMIN — MEGESTROL ACETATE SCH MG: 20 TABLET ORAL at 09:28

## 2021-02-14 RX ADMIN — Medication SCH EACH: at 12:14

## 2021-02-14 RX ADMIN — MEMANTINE HYDROCHLORIDE SCH MG: 5 TABLET ORAL at 17:32

## 2021-02-14 RX ADMIN — DONEPEZIL HYDROCHLORIDE SCH MG: 10 TABLET, FILM COATED ORAL at 09:28

## 2021-02-14 RX ADMIN — LEVOTHYROXINE SODIUM SCH MCG: 25 TABLET ORAL at 05:51

## 2021-02-14 RX ADMIN — LORAZEPAM PRN MG: 0.5 TABLET ORAL at 23:30

## 2021-02-14 RX ADMIN — Medication SCH EACH: at 05:51

## 2021-02-14 RX ADMIN — TAMSULOSIN HYDROCHLORIDE SCH MG: 0.4 CAPSULE ORAL at 21:30

## 2021-02-14 RX ADMIN — ANORECTAL OINTMENT SCH GM: 15.7; .44; 24; 20.6 OINTMENT TOPICAL at 21:51

## 2021-02-14 RX ADMIN — CLOTRIMAZOLE SCH GM: 1 CREAM TOPICAL at 09:30

## 2021-02-14 RX ADMIN — Medication SCH EACH: at 21:34

## 2021-02-14 RX ADMIN — GABAPENTIN SCH MG: 100 CAPSULE ORAL at 09:28

## 2021-02-14 RX ADMIN — CLOPIDOGREL BISULFATE SCH MG: 75 TABLET ORAL at 09:28

## 2021-02-14 RX ADMIN — DIVALPROEX SODIUM SCH MG: 125 CAPSULE ORAL at 09:27

## 2021-02-14 RX ADMIN — DIVALPROEX SODIUM SCH MG: 125 CAPSULE ORAL at 17:33

## 2021-02-14 RX ADMIN — MEMANTINE HYDROCHLORIDE SCH MG: 5 TABLET ORAL at 09:29

## 2021-02-14 RX ADMIN — GABAPENTIN SCH MG: 100 CAPSULE ORAL at 17:33

## 2021-02-15 VITALS — SYSTOLIC BLOOD PRESSURE: 148 MMHG | DIASTOLIC BLOOD PRESSURE: 59 MMHG

## 2021-02-15 VITALS — SYSTOLIC BLOOD PRESSURE: 170 MMHG | DIASTOLIC BLOOD PRESSURE: 65 MMHG

## 2021-02-15 VITALS — DIASTOLIC BLOOD PRESSURE: 58 MMHG | SYSTOLIC BLOOD PRESSURE: 141 MMHG

## 2021-02-15 LAB
APPEARANCE UR: (no result)
BASOPHILS # BLD AUTO: 0 K/UL (ref 0–8)
BASOPHILS NFR BLD AUTO: 0.2 % (ref 0–2)
BILIRUB UR QL STRIP: NEGATIVE
BUN SERPL-MCNC: 86 MG/DL (ref 7–18)
CHLORIDE SERPL-SCNC: 106 MMOL/L (ref 98–107)
CK SERPL-CCNC: 396 U/L (ref 39–308)
CO2 SERPL-SCNC: 26 MMOL/L (ref 21–32)
COLOR UR: YELLOW
CREAT SERPL-MCNC: 2.4 MG/DL (ref 0.6–1.3)
DEPRECATED SQUAMOUS URNS QL MICRO: (no result) /HPF
EOSINOPHIL # BLD AUTO: 0 K/UL (ref 0–0.7)
EOSINOPHIL NFR BLD AUTO: 0.6 % (ref 0–7)
GLUCOSE SERPL-MCNC: 99 MG/DL (ref 74–106)
GLUCOSE UR STRIP-MCNC: NEGATIVE MG/DL
HCT VFR BLD AUTO: 24.3 % (ref 36.7–47.1)
HGB BLD-MCNC: 8.4 G/DL (ref 12.5–16.3)
HGB UR QL STRIP: (no result)
KETONES UR STRIP-MCNC: NEGATIVE MG/DL
LEUKOCYTE ESTERASE UR QL STRIP: (no result)
LYMPHOCYTES # BLD AUTO: 0.7 K/UL (ref 20–40)
LYMPHOCYTES NFR BLD AUTO: 10.4 % (ref 20.5–51.5)
MAGNESIUM SERPL-MCNC: 1.9 MG/DL (ref 1.8–2.4)
MCH RBC QN AUTO: 35.3 UUG (ref 23.8–33.4)
MCHC RBC AUTO-ENTMCNC: 35 G/DL (ref 32.5–36.3)
MCV RBC AUTO: 102.1 FL (ref 73–96.2)
MONOCYTES # BLD AUTO: 0.6 K/UL (ref 2–10)
MONOCYTES NFR BLD AUTO: 9 % (ref 0–11)
NEUTROPHILS # BLD AUTO: 5.4 K/UL (ref 1.8–8.9)
NEUTROPHILS NFR BLD AUTO: 79.8 % (ref 38.5–71.5)
NITRITE UR QL STRIP: NEGATIVE
PH UR STRIP: 5.5 [PH] (ref 5–8)
PHOSPHATE SERPL-MCNC: 4.8 MG/DL (ref 2.5–4.9)
PLATELET # BLD AUTO: 150 K/UL (ref 152–348)
POTASSIUM SERPL-SCNC: 3.8 MMOL/L (ref 3.5–5.1)
RBC # BLD AUTO: 2.38 MIL/UL (ref 4.06–5.63)
RBC #/AREA URNS HPF: (no result) /HPF (ref 0–3)
SP GR UR STRIP: 1.02 (ref 1–1.03)
UROBILINOGEN UR STRIP-MCNC: 0.2 E.U./DL
WBC # BLD AUTO: 6.8 K/UL (ref 3.6–10.2)
WBC #/AREA URNS HPF: (no result) /HPF
WBC #/AREA URNS HPF: (no result) /HPF (ref 0–3)

## 2021-02-15 RX ADMIN — GABAPENTIN SCH MG: 300 CAPSULE ORAL at 20:55

## 2021-02-15 RX ADMIN — METOPROLOL SUCCINATE SCH MG: 50 TABLET, FILM COATED, EXTENDED RELEASE ORAL at 09:15

## 2021-02-15 RX ADMIN — Medication SCH EACH: at 17:17

## 2021-02-15 RX ADMIN — MEGESTROL ACETATE SCH MG: 20 TABLET ORAL at 09:15

## 2021-02-15 RX ADMIN — DIVALPROEX SODIUM SCH MG: 125 CAPSULE ORAL at 12:35

## 2021-02-15 RX ADMIN — SODIUM CHLORIDE PRN UNIT: 9 INJECTION, SOLUTION INTRAVENOUS at 21:11

## 2021-02-15 RX ADMIN — Medication SCH EACH: at 20:54

## 2021-02-15 RX ADMIN — Medication SCH EACH: at 07:00

## 2021-02-15 RX ADMIN — DIVALPROEX SODIUM SCH MG: 125 CAPSULE ORAL at 09:14

## 2021-02-15 RX ADMIN — DIVALPROEX SODIUM SCH MG: 125 CAPSULE ORAL at 17:17

## 2021-02-15 RX ADMIN — Medication SCH EACH: at 06:35

## 2021-02-15 RX ADMIN — CLOTRIMAZOLE SCH GM: 1 CREAM TOPICAL at 17:18

## 2021-02-15 RX ADMIN — SODIUM CHLORIDE PRN UNIT: 9 INJECTION, SOLUTION INTRAVENOUS at 11:43

## 2021-02-15 RX ADMIN — GABAPENTIN SCH MG: 100 CAPSULE ORAL at 17:17

## 2021-02-15 RX ADMIN — ATORVASTATIN CALCIUM SCH MG: 40 TABLET, FILM COATED ORAL at 20:55

## 2021-02-15 RX ADMIN — Medication SCH EACH: at 11:42

## 2021-02-15 RX ADMIN — GABAPENTIN SCH MG: 100 CAPSULE ORAL at 09:15

## 2021-02-15 RX ADMIN — TAMSULOSIN HYDROCHLORIDE SCH MG: 0.4 CAPSULE ORAL at 20:55

## 2021-02-15 RX ADMIN — Medication SCH EA: at 09:16

## 2021-02-15 RX ADMIN — ANORECTAL OINTMENT SCH GM: 15.7; .44; 24; 20.6 OINTMENT TOPICAL at 20:54

## 2021-02-15 RX ADMIN — MEGESTROL ACETATE SCH MG: 20 TABLET ORAL at 17:17

## 2021-02-15 RX ADMIN — ANORECTAL OINTMENT SCH GM: 15.7; .44; 24; 20.6 OINTMENT TOPICAL at 09:16

## 2021-02-15 RX ADMIN — CLOTRIMAZOLE SCH GM: 1 CREAM TOPICAL at 09:16

## 2021-02-15 RX ADMIN — DONEPEZIL HYDROCHLORIDE SCH MG: 10 TABLET, FILM COATED ORAL at 09:15

## 2021-02-15 RX ADMIN — SODIUM CHLORIDE PRN UNIT: 9 INJECTION, SOLUTION INTRAVENOUS at 23:32

## 2021-02-15 RX ADMIN — CLOPIDOGREL BISULFATE SCH MG: 75 TABLET ORAL at 09:15

## 2021-02-15 RX ADMIN — INSULIN GLARGINE SCH UNITS: 100 INJECTION, SOLUTION SUBCUTANEOUS at 20:56

## 2021-02-15 RX ADMIN — MEMANTINE HYDROCHLORIDE SCH MG: 5 TABLET ORAL at 09:14

## 2021-02-15 RX ADMIN — MEMANTINE HYDROCHLORIDE SCH MG: 5 TABLET ORAL at 17:17

## 2021-02-15 RX ADMIN — FUROSEMIDE SCH MG: 40 TABLET ORAL at 09:15

## 2021-02-15 RX ADMIN — LEVOTHYROXINE SODIUM SCH MCG: 25 TABLET ORAL at 06:12

## 2021-02-16 VITALS — DIASTOLIC BLOOD PRESSURE: 52 MMHG | SYSTOLIC BLOOD PRESSURE: 139 MMHG

## 2021-02-16 VITALS — DIASTOLIC BLOOD PRESSURE: 52 MMHG | SYSTOLIC BLOOD PRESSURE: 159 MMHG

## 2021-02-16 LAB
ALP SERPL-CCNC: 48 U/L (ref 50–136)
ALT SERPL W/O P-5'-P-CCNC: 36 U/L (ref 16–63)
AST SERPL-CCNC: 29 U/L (ref 15–37)
BASOPHILS # BLD AUTO: 0 K/UL (ref 0–8)
BASOPHILS NFR BLD AUTO: 0.3 % (ref 0–2)
BILIRUB SERPL-MCNC: 0.3 MG/DL (ref 0.2–1)
BUN SERPL-MCNC: 81 MG/DL (ref 7–18)
CHLORIDE SERPL-SCNC: 106 MMOL/L (ref 98–107)
CO2 SERPL-SCNC: 27 MMOL/L (ref 21–32)
CREAT SERPL-MCNC: 2.1 MG/DL (ref 0.6–1.3)
EOSINOPHIL # BLD AUTO: 0.1 K/UL (ref 0–0.7)
EOSINOPHIL NFR BLD AUTO: 2 % (ref 0–7)
GLUCOSE SERPL-MCNC: 88 MG/DL (ref 74–106)
HCT VFR BLD AUTO: 26.6 % (ref 36.7–47.1)
HGB BLD-MCNC: 9.2 G/DL (ref 12.5–16.3)
LYMPHOCYTES # BLD AUTO: 0.9 K/UL (ref 20–40)
LYMPHOCYTES NFR BLD AUTO: 15.6 % (ref 20.5–51.5)
MAGNESIUM SERPL-MCNC: 2 MG/DL (ref 1.8–2.4)
MCH RBC QN AUTO: 35.6 UUG (ref 23.8–33.4)
MCHC RBC AUTO-ENTMCNC: 35 G/DL (ref 32.5–36.3)
MCV RBC AUTO: 102.9 FL (ref 73–96.2)
MONOCYTES # BLD AUTO: 0.6 K/UL (ref 2–10)
MONOCYTES NFR BLD AUTO: 9.8 % (ref 0–11)
NEUTROPHILS # BLD AUTO: 4.1 K/UL (ref 1.8–8.9)
NEUTROPHILS NFR BLD AUTO: 72.3 % (ref 38.5–71.5)
PHOSPHATE SERPL-MCNC: 3.8 MG/DL (ref 2.5–4.9)
PLATELET # BLD AUTO: 156 K/UL (ref 152–348)
POTASSIUM SERPL-SCNC: 3.7 MMOL/L (ref 3.5–5.1)
RBC # BLD AUTO: 2.59 MIL/UL (ref 4.06–5.63)
WBC # BLD AUTO: 5.6 K/UL (ref 3.6–10.2)
WS STN SPEC: 5.7 G/DL (ref 6.4–8.2)

## 2021-02-16 RX ADMIN — DONEPEZIL HYDROCHLORIDE SCH MG: 10 TABLET, FILM COATED ORAL at 08:58

## 2021-02-16 RX ADMIN — CLOPIDOGREL BISULFATE SCH MG: 75 TABLET ORAL at 08:58

## 2021-02-16 RX ADMIN — CLOTRIMAZOLE SCH GM: 1 CREAM TOPICAL at 09:01

## 2021-02-16 RX ADMIN — ANORECTAL OINTMENT SCH GM: 15.7; .44; 24; 20.6 OINTMENT TOPICAL at 09:01

## 2021-02-16 RX ADMIN — GABAPENTIN SCH MG: 100 CAPSULE ORAL at 08:58

## 2021-02-16 RX ADMIN — MEMANTINE HYDROCHLORIDE SCH MG: 5 TABLET ORAL at 08:58

## 2021-02-16 RX ADMIN — Medication SCH EACH: at 06:39

## 2021-02-16 RX ADMIN — Medication SCH EA: at 09:02

## 2021-02-16 RX ADMIN — METOPROLOL SUCCINATE SCH MG: 50 TABLET, FILM COATED, EXTENDED RELEASE ORAL at 09:00

## 2021-02-16 RX ADMIN — LEVOTHYROXINE SODIUM SCH MCG: 25 TABLET ORAL at 06:03

## 2021-02-16 RX ADMIN — MEGESTROL ACETATE SCH MG: 20 TABLET ORAL at 09:02

## 2021-02-16 RX ADMIN — DIVALPROEX SODIUM SCH MG: 125 CAPSULE ORAL at 08:58

## 2021-02-17 LAB
ALBUMIN SERPL ELPH-MCNC: 2.4 G/DL (ref 2.9–4.4)
ALBUMIN/GLOB SERPL: 1.1 {RATIO} (ref 0.7–1.7)
ALPHA1 GLOB SERPL ELPH-MCNC: 0.3 G/DL (ref 0–0.4)
ALPHA2 GLOB SERPL ELPH-MCNC: 0.8 G/DL (ref 0.4–1)
B-GLOBULIN SERPL ELPH-MCNC: 0.7 G/DL (ref 0.7–1.3)
GAMMA GLOB SERPL ELPH-MCNC: 0.5 G/DL (ref 0.4–1.8)
GLOBULIN SER CALC-MCNC: 2.2 G/DL (ref 2.2–3.9)

## 2021-02-25 ENCOUNTER — HOSPITAL ENCOUNTER (INPATIENT)
Dept: HOSPITAL 54 - ER | Age: 86
LOS: 7 days | Discharge: HOME HEALTH | DRG: 673 | End: 2021-03-04
Attending: NURSE PRACTITIONER | Admitting: NURSE PRACTITIONER
Payer: MEDICARE

## 2021-02-25 VITALS — BODY MASS INDEX: 22.05 KG/M2 | WEIGHT: 154 LBS | HEIGHT: 70 IN

## 2021-02-25 VITALS — DIASTOLIC BLOOD PRESSURE: 55 MMHG | SYSTOLIC BLOOD PRESSURE: 138 MMHG

## 2021-02-25 DIAGNOSIS — Z79.4: ICD-10-CM

## 2021-02-25 DIAGNOSIS — E87.5: ICD-10-CM

## 2021-02-25 DIAGNOSIS — E11.622: ICD-10-CM

## 2021-02-25 DIAGNOSIS — D63.8: ICD-10-CM

## 2021-02-25 DIAGNOSIS — N40.1: ICD-10-CM

## 2021-02-25 DIAGNOSIS — E11.621: ICD-10-CM

## 2021-02-25 DIAGNOSIS — E11.22: ICD-10-CM

## 2021-02-25 DIAGNOSIS — Z20.822: ICD-10-CM

## 2021-02-25 DIAGNOSIS — L97.419: ICD-10-CM

## 2021-02-25 DIAGNOSIS — F05: ICD-10-CM

## 2021-02-25 DIAGNOSIS — Z73.6: ICD-10-CM

## 2021-02-25 DIAGNOSIS — L97.429: ICD-10-CM

## 2021-02-25 DIAGNOSIS — G92: ICD-10-CM

## 2021-02-25 DIAGNOSIS — N17.0: ICD-10-CM

## 2021-02-25 DIAGNOSIS — Z87.440: ICD-10-CM

## 2021-02-25 DIAGNOSIS — B96.20: ICD-10-CM
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
## 2021-02-25 DIAGNOSIS — F01.50: ICD-10-CM

## 2021-02-25 DIAGNOSIS — I50.9: ICD-10-CM

## 2021-02-25 DIAGNOSIS — L97.529: ICD-10-CM
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
## 2021-02-25 DIAGNOSIS — F25.9: ICD-10-CM

## 2021-02-25 DIAGNOSIS — L89.153: ICD-10-CM

## 2021-02-25 DIAGNOSIS — E11.40: ICD-10-CM

## 2021-02-25 DIAGNOSIS — N39.0: Primary | ICD-10-CM

## 2021-02-25 DIAGNOSIS — I13.0: ICD-10-CM

## 2021-02-25 DIAGNOSIS — N18.32: ICD-10-CM
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
## 2021-02-25 DIAGNOSIS — I21.4: ICD-10-CM

## 2021-02-25 DIAGNOSIS — L97.919: ICD-10-CM

## 2021-02-25 DIAGNOSIS — L98.8: ICD-10-CM

## 2021-02-25 DIAGNOSIS — S61.205A: ICD-10-CM

## 2021-02-25 DIAGNOSIS — E78.5: ICD-10-CM

## 2021-02-25 LAB
ALBUMIN SERPL BCP-MCNC: 2.9 G/DL (ref 3.4–5)
ALP SERPL-CCNC: 52 U/L (ref 46–116)
ALT SERPL W P-5'-P-CCNC: 52 U/L (ref 12–78)
APAP SERPL-MCNC: < 2 UG/ML (ref 10–30)
AST SERPL W P-5'-P-CCNC: 58 U/L (ref 15–37)
BASOPHILS # BLD AUTO: 0 /CMM (ref 0–0.2)
BASOPHILS NFR BLD AUTO: 0.2 % (ref 0–2)
BILIRUB DIRECT SERPL-MCNC: 0.1 MG/DL (ref 0–0.2)
BILIRUB SERPL-MCNC: 0.2 MG/DL (ref 0.2–1)
BUN SERPL-MCNC: 62 MG/DL (ref 7–18)
CALCIUM SERPL-MCNC: 8.9 MG/DL (ref 8.5–10.1)
CHLORIDE SERPL-SCNC: 108 MMOL/L (ref 98–107)
CO2 SERPL-SCNC: 25 MMOL/L (ref 21–32)
COLOR UR: YELLOW
CREAT SERPL-MCNC: 2.4 MG/DL (ref 0.6–1.3)
EOSINOPHIL NFR BLD AUTO: 1.1 % (ref 0–6)
ETHANOL SERPL-MCNC: < 3 MG/DL (ref 0–0)
GLUCOSE SERPL-MCNC: 241 MG/DL (ref 74–106)
HCT VFR BLD AUTO: 27 % (ref 39–51)
HGB BLD-MCNC: 9.3 G/DL (ref 13.5–17.5)
LYMPHOCYTES NFR BLD AUTO: 0.5 /CMM (ref 0.8–4.8)
LYMPHOCYTES NFR BLD AUTO: 7 % (ref 20–44)
MCHC RBC AUTO-ENTMCNC: 34 G/DL (ref 31–36)
MCV RBC AUTO: 103 FL (ref 80–96)
MONOCYTES NFR BLD AUTO: 0.5 /CMM (ref 0.1–1.3)
MONOCYTES NFR BLD AUTO: 7.3 % (ref 2–12)
NEUTROPHILS # BLD AUTO: 5.7 /CMM (ref 1.8–8.9)
NEUTROPHILS NFR BLD AUTO: 84.4 % (ref 43–81)
PH UR STRIP: 5.5 [PH] (ref 5–8)
PLATELET # BLD AUTO: 208 /CMM (ref 150–450)
POTASSIUM SERPL-SCNC: 5.5 MMOL/L (ref 3.5–5.1)
PROT SERPL-MCNC: 6.3 G/DL (ref 6.4–8.2)
PROT UR QL STRIP: 100 MG/DL
RBC # BLD AUTO: 2.64 MIL/UL (ref 4.5–6)
RBC #/AREA URNS HPF: (no result) /HPF (ref 0–2)
SODIUM SERPL-SCNC: 143 MMOL/L (ref 136–145)
UROBILINOGEN UR STRIP-MCNC: 0.2 EU/DL
WBC #/AREA URNS HPF: (no result) /HPF
WBC #/AREA URNS HPF: (no result) /HPF (ref 0–3)
WBC NRBC COR # BLD AUTO: 6.8 K/UL (ref 4.3–11)

## 2021-02-25 PROCEDURE — G0378 HOSPITAL OBSERVATION PER HR: HCPCS

## 2021-02-25 PROCEDURE — U0003 INFECTIOUS AGENT DETECTION BY NUCLEIC ACID (DNA OR RNA); SEVERE ACUTE RESPIRATORY SYNDROME CORONAVIRUS 2 (SARS-COV-2) (CORONAVIRUS DISEASE [COVID-19]), AMPLIFIED PROBE TECHNIQUE, MAKING USE OF HIGH THROUGHPUT TECHNOLOGIES AS DESCRIBED BY CMS-2020-01-R: HCPCS

## 2021-02-25 PROCEDURE — A4349 DISPOSABLE MALE EXTERNAL CAT: HCPCS

## 2021-02-25 PROCEDURE — A6403 STERILE GAUZE>16 <= 48 SQ IN: HCPCS

## 2021-02-25 PROCEDURE — G0480 DRUG TEST DEF 1-7 CLASSES: HCPCS

## 2021-02-25 PROCEDURE — C9803 HOPD COVID-19 SPEC COLLECT: HCPCS

## 2021-02-25 PROCEDURE — A6248 HYDROGEL DRSG GEL FILLER: HCPCS

## 2021-02-25 NOTE — NUR
RN NOTE

PT PHYSICALLY AND VERBALLY AGGRESSIVE. ALSO ATTEMPTING TO PULL OUT IV AND PUNCH STAFF 
MEMBERS DESPITE REORIENTATION AND DE-ESCALATION OF STIMULI, OBTAINED ORDER FOR BILATERAL 
SOFT WRIST RESTRAINTS FROM KIMBERLY TAYLOR.

## 2021-02-25 NOTE — NUR
PT AAOX3 (NAME, , YEAR) EDWINA FROM [facility] FOR MEDICAL 
AND PSYCH CLEARANCE FOR BEING AGGRESSIVE. PT PLACED ON MONITOR AND PULSE OX. 
VSS. AWAITING ORDERS.

## 2021-02-26 VITALS — DIASTOLIC BLOOD PRESSURE: 49 MMHG | SYSTOLIC BLOOD PRESSURE: 134 MMHG

## 2021-02-26 VITALS — SYSTOLIC BLOOD PRESSURE: 134 MMHG | DIASTOLIC BLOOD PRESSURE: 68 MMHG

## 2021-02-26 VITALS — SYSTOLIC BLOOD PRESSURE: 106 MMHG | DIASTOLIC BLOOD PRESSURE: 52 MMHG

## 2021-02-26 VITALS — DIASTOLIC BLOOD PRESSURE: 62 MMHG | SYSTOLIC BLOOD PRESSURE: 154 MMHG

## 2021-02-26 VITALS — DIASTOLIC BLOOD PRESSURE: 62 MMHG | SYSTOLIC BLOOD PRESSURE: 140 MMHG

## 2021-02-26 LAB
ALBUMIN SERPL BCP-MCNC: 2.7 G/DL (ref 3.4–5)
ALP SERPL-CCNC: 49 U/L (ref 46–116)
ALT SERPL W P-5'-P-CCNC: 52 U/L (ref 12–78)
AST SERPL W P-5'-P-CCNC: 49 U/L (ref 15–37)
BASOPHILS # BLD AUTO: 0 /CMM (ref 0–0.2)
BASOPHILS NFR BLD AUTO: 0.2 % (ref 0–2)
BILIRUB SERPL-MCNC: 0.2 MG/DL (ref 0.2–1)
BUN SERPL-MCNC: 62 MG/DL (ref 7–18)
CALCIUM SERPL-MCNC: 8.8 MG/DL (ref 8.5–10.1)
CHLORIDE SERPL-SCNC: 109 MMOL/L (ref 98–107)
CHOLEST SERPL-MCNC: 70 MG/DL (ref ?–200)
CO2 SERPL-SCNC: 23 MMOL/L (ref 21–32)
CREAT SERPL-MCNC: 2 MG/DL (ref 0.6–1.3)
EOSINOPHIL NFR BLD AUTO: 1.9 % (ref 0–6)
GLUCOSE SERPL-MCNC: 159 MG/DL (ref 74–106)
HCT VFR BLD AUTO: 25 % (ref 39–51)
HDLC SERPL-MCNC: 43 MG/DL (ref 40–60)
HGB BLD-MCNC: 8.7 G/DL (ref 13.5–17.5)
LDLC SERPL DIRECT ASSAY-MCNC: 23 MG/DL (ref 0–99)
LYMPHOCYTES NFR BLD AUTO: 0.6 /CMM (ref 0.8–4.8)
LYMPHOCYTES NFR BLD AUTO: 9.6 % (ref 20–44)
MAGNESIUM SERPL-MCNC: 2 MG/DL (ref 1.8–2.4)
MCHC RBC AUTO-ENTMCNC: 34 G/DL (ref 31–36)
MCV RBC AUTO: 102 FL (ref 80–96)
MONOCYTES NFR BLD AUTO: 0.5 /CMM (ref 0.1–1.3)
MONOCYTES NFR BLD AUTO: 8.3 % (ref 2–12)
NEUTROPHILS # BLD AUTO: 4.9 /CMM (ref 1.8–8.9)
NEUTROPHILS NFR BLD AUTO: 80 % (ref 43–81)
PHOSPHATE SERPL-MCNC: 3.5 MG/DL (ref 2.5–4.9)
PLATELET # BLD AUTO: 194 /CMM (ref 150–450)
POTASSIUM SERPL-SCNC: 4.6 MMOL/L (ref 3.5–5.1)
PROT SERPL-MCNC: 6 G/DL (ref 6.4–8.2)
RBC # BLD AUTO: 2.47 MIL/UL (ref 4.5–6)
SODIUM SERPL-SCNC: 145 MMOL/L (ref 136–145)
TRIGL SERPL-MCNC: 41 MG/DL (ref 30–150)
TSH SERPL DL<=0.005 MIU/L-ACNC: 5.05 UIU/ML (ref 0.36–3.74)
WBC NRBC COR # BLD AUTO: 6.1 K/UL (ref 4.3–11)

## 2021-02-26 RX ADMIN — DEXTROSE MONOHYDRATE SCH MLS/HR: 50 INJECTION, SOLUTION INTRAVENOUS at 00:37

## 2021-02-26 RX ADMIN — SODIUM CHLORIDE PRN MLS/HR: 9 INJECTION, SOLUTION INTRAVENOUS at 12:35

## 2021-02-26 RX ADMIN — SODIUM CHLORIDE PRN MLS/HR: 9 INJECTION, SOLUTION INTRAVENOUS at 23:33

## 2021-02-26 RX ADMIN — Medication SCH EACH: at 12:10

## 2021-02-26 RX ADMIN — GABAPENTIN SCH MG: 100 CAPSULE ORAL at 17:34

## 2021-02-26 RX ADMIN — Medication SCH OZ: at 10:27

## 2021-02-26 RX ADMIN — CARVEDILOL SCH MG: 12.5 TABLET, FILM COATED ORAL at 09:13

## 2021-02-26 RX ADMIN — Medication SCH EACH: at 17:34

## 2021-02-26 RX ADMIN — Medication SCH EACH: at 21:35

## 2021-02-26 RX ADMIN — HUMAN INSULIN PRN UNIT: 100 INJECTION, SOLUTION SUBCUTANEOUS at 21:40

## 2021-02-26 RX ADMIN — ENOXAPARIN SODIUM SCH MG: 30 INJECTION SUBCUTANEOUS at 08:32

## 2021-02-26 RX ADMIN — DEXTROSE MONOHYDRATE SCH MLS/HR: 50 INJECTION, SOLUTION INTRAVENOUS at 23:21

## 2021-02-26 RX ADMIN — CARVEDILOL SCH MG: 12.5 TABLET, FILM COATED ORAL at 21:35

## 2021-02-26 RX ADMIN — Medication SCH GM: at 10:28

## 2021-02-26 RX ADMIN — GABAPENTIN SCH MG: 300 CAPSULE ORAL at 21:34

## 2021-02-26 RX ADMIN — TAMSULOSIN HYDROCHLORIDE SCH MG: 0.4 CAPSULE ORAL at 21:34

## 2021-02-26 RX ADMIN — MEGESTROL ACETATE SCH MG: 40 TABLET ORAL at 17:34

## 2021-02-26 RX ADMIN — ASPIRIN 81 MG SCH MG: 81 TABLET ORAL at 08:32

## 2021-02-26 NOTE — NUR
m/s lvn: notes



consent obtained from sidney adams (daughter)via phone with another nurse as a witnessed for 
Serial excisional wound debridment of right heel wound.

## 2021-02-26 NOTE — NUR
m/s lvn: notes



noted iv out, pt on lying on the side. pressure dressing applied to iv site. released and 
repositioned marely wrist restraint. kept pt comfortable. will continue to monitor.

## 2021-02-26 NOTE — NUR
WOUND CARE CONSULT: PT PRESENTS WITH MULTIPLE WOUNDS, PRESENT ON ADMISSION INCLUDING SACRAL 
AND LOWER EXTREMITY WOUNDS. RECOMMEND SURGICAL AND DPM CONSULTS. DR VARUN OLIVER AND DR VIERA CALLED FOR CONSULT REQUESTS. RECOMMENDATIONS MADE FOR SKIN PROTECTION AND WOUND 
CARE. DISCUSSED WITH NURSING STAFF. PT TO BE PLACED ON [place] ISOFLEX LOW AIRLOSS BED. MD 
IN AGREEMENT WITH PLAN OF CARE. 

-------------------------------------------------------------------------------

Addendum: 02/26/21 at 0916 by EB RICHARD WNDNU

-------------------------------------------------------------------------------

Amended: Links added.

## 2021-02-26 NOTE — NUR
m/s lvn: notes



received pt in bed asleep, but easily arousable. on marely wrist restraint, released and 
repositioned. kept comfortable. will continue to monitor.

## 2021-02-26 NOTE — NUR
RN NOTE

NO ACUTE CHANGES OBSERVED OVERNIGHT, PT CURRENTLY SLEEPING IN BED IN SEMI AMBROCIO'S POSITION. 
NO SIGNS OF PAIN OR DISCOMFORT. ON ROOM AIR, RESPIRATIONS UNLABORED, SR ON THE TELE MONITOR, 
WITH RIGHT HAND 20G IV FLUSHED AND PATENT WITHOUT COMPLICATIONS NOTED AT SITE. ALL NEEDS MET 
AND ATTENDED TO, SAFETY MEASURES IN PLACE PER PROTOCOL, BILATERAL SOFT WRIST RESTRAINTS IN 
PLACE AS ORDERED FOR SAFETY, VISUAL AND CIRCULATORY CHECKS DONE Q15M, BED ALARM ON, BED 
LOCKED AND IN LOW POSITION, SIDE RAILS UP X 2, WILL ENDORSE TO MORNING RN FOR TR.

## 2021-02-26 NOTE — NUR
WOUND CARE CONSULT: PT HAVING PROCEDURE AT THIS TIME. WILL SEE PT AT LATER TIME FOR SKIN 
ASSESSMENT.

## 2021-02-26 NOTE — NUR
TELE RN NOTE 

 RECEIVED LYING IN BED IN SEMI AMBROCIO'S POSITION, AWAKE AND ALERT/ORIENTED  PT CURRENTLY ON  
RA NO SOB NOTED AT THIS TIME . RESPIRATIONS EVEN AND UNLABORED. PT DENIES TROUBLE BREATHING. 
PT ALSO DENIES PAIN OR DISCOMFORT AT THIS TIME. NSR ON THE CARDIAC MONITOR, IS WITH L RIGHT 
HAND 20G IV HL INTACT AND FLUSHED WELL    PLAN OF CARE DISCUSSED, SAFETY MEASURES IN PLACE, 
BED LOCKED AND IN LOW POSITION, SIDE RAILS UP X 2, CALL LIGHT WITHIN REACH, WILL MONITOR 
PATIENT CLOSELY, FED BREAKFAST BY BRYANNA

## 2021-02-26 NOTE — NUR
MS RN NOTE 

 PER [facility] DOCTOR GANESH PLACED ORDER FOR RISPERIDONE ,ORDER CARRIED OUT

## 2021-02-26 NOTE — NUR
RN NOTE



RECEIVED PT CURRENTLY AWAKE IN BED IN LYING POSITION. CONFUSED. ALERT AND ORIENTED X 2 WITH 
VERBAL AGGRESSION. REORIENTED PT AND REPOSITIONED WITH 2 PERSON ASSIST. PT DENIES PAIN OR 
DISCOMFORT. ON ROOM AIR, RESPIRATIONS UNLABORED, WITH RIGHT UPPER ARM 20G IV FLUSHED AND 
PATENT WITHOUT COMPLICATIONS NOTED AT SITE. WITH NS @ 100ML/HOUR RUNNING AS ORDERED. CONDOM 
CATH IN PLACE. SAFETY MEASURES IN PLACE PER PROTOCOL, BILATERAL SOFT WRIST RESTRAINTS IN 
PLACE AS ORDERED FOR SAFETY, WILL DO VISUAL AND CIRCULATORY CHECKS  Q15M, BED ALARM ON, BED 
LOCKED AND IN LOW POSITION, SIDE RAILS UP X 2, WILL MONITOR PT AND CARRY OUT ACTIVE MD 
ORDERS.

## 2021-02-27 VITALS — SYSTOLIC BLOOD PRESSURE: 122 MMHG | DIASTOLIC BLOOD PRESSURE: 53 MMHG

## 2021-02-27 VITALS — DIASTOLIC BLOOD PRESSURE: 61 MMHG | SYSTOLIC BLOOD PRESSURE: 144 MMHG

## 2021-02-27 VITALS — SYSTOLIC BLOOD PRESSURE: 147 MMHG | DIASTOLIC BLOOD PRESSURE: 56 MMHG

## 2021-02-27 VITALS — DIASTOLIC BLOOD PRESSURE: 68 MMHG | SYSTOLIC BLOOD PRESSURE: 113 MMHG

## 2021-02-27 LAB
ALBUMIN SERPL BCP-MCNC: 2.5 G/DL (ref 3.4–5)
ALP SERPL-CCNC: 41 U/L (ref 46–116)
ALT SERPL W P-5'-P-CCNC: 41 U/L (ref 12–78)
AST SERPL W P-5'-P-CCNC: 36 U/L (ref 15–37)
BASOPHILS # BLD AUTO: 0 /CMM (ref 0–0.2)
BASOPHILS NFR BLD AUTO: 0.3 % (ref 0–2)
BILIRUB SERPL-MCNC: 0.3 MG/DL (ref 0.2–1)
BUN SERPL-MCNC: 46 MG/DL (ref 7–18)
CALCIUM SERPL-MCNC: 8.6 MG/DL (ref 8.5–10.1)
CHLORIDE SERPL-SCNC: 112 MMOL/L (ref 98–107)
CO2 SERPL-SCNC: 22 MMOL/L (ref 21–32)
CREAT SERPL-MCNC: 1.5 MG/DL (ref 0.6–1.3)
EOSINOPHIL NFR BLD AUTO: 1.9 % (ref 0–6)
FERRITIN SERPL-MCNC: 347 NG/ML (ref 8–388)
GLUCOSE SERPL-MCNC: 126 MG/DL (ref 74–106)
HCT VFR BLD AUTO: 24 % (ref 39–51)
HGB BLD-MCNC: 8.2 G/DL (ref 13.5–17.5)
IRON SERPL-MCNC: 33 UG/DL (ref 50–175)
LYMPHOCYTES NFR BLD AUTO: 0.7 /CMM (ref 0.8–4.8)
LYMPHOCYTES NFR BLD AUTO: 12.9 % (ref 20–44)
MAGNESIUM SERPL-MCNC: 1.8 MG/DL (ref 1.8–2.4)
MCHC RBC AUTO-ENTMCNC: 34 G/DL (ref 31–36)
MCV RBC AUTO: 102 FL (ref 80–96)
MONOCYTES NFR BLD AUTO: 0.4 /CMM (ref 0.1–1.3)
MONOCYTES NFR BLD AUTO: 7.5 % (ref 2–12)
NEUTROPHILS # BLD AUTO: 4.3 /CMM (ref 1.8–8.9)
NEUTROPHILS NFR BLD AUTO: 77.4 % (ref 43–81)
PHOSPHATE SERPL-MCNC: 3.4 MG/DL (ref 2.5–4.9)
PLATELET # BLD AUTO: 191 /CMM (ref 150–450)
POTASSIUM SERPL-SCNC: 4.5 MMOL/L (ref 3.5–5.1)
PROT SERPL-MCNC: 5.5 G/DL (ref 6.4–8.2)
RBC # BLD AUTO: 2.39 MIL/UL (ref 4.5–6)
SODIUM SERPL-SCNC: 147 MMOL/L (ref 136–145)
TIBC SERPL-MCNC: 171 UG/DL (ref 250–450)
WBC NRBC COR # BLD AUTO: 5.5 K/UL (ref 4.3–11)

## 2021-02-27 RX ADMIN — HUMAN INSULIN PRN UNIT: 100 INJECTION, SOLUTION SUBCUTANEOUS at 21:12

## 2021-02-27 RX ADMIN — Medication SCH EACH: at 11:53

## 2021-02-27 RX ADMIN — INSULIN HUMAN PRN UNIT: 100 INJECTION, SOLUTION PARENTERAL at 12:03

## 2021-02-27 RX ADMIN — DEXTROSE MONOHYDRATE SCH MLS/HR: 50 INJECTION, SOLUTION INTRAVENOUS at 22:30

## 2021-02-27 RX ADMIN — ATORVASTATIN CALCIUM SCH MG: 40 TABLET, FILM COATED ORAL at 08:13

## 2021-02-27 RX ADMIN — Medication SCH EACH: at 21:09

## 2021-02-27 RX ADMIN — MEMANTINE HYDROCHLORIDE SCH MG: 5 TABLET ORAL at 08:13

## 2021-02-27 RX ADMIN — GABAPENTIN SCH MG: 100 CAPSULE ORAL at 16:26

## 2021-02-27 RX ADMIN — CLOPIDOGREL BISULFATE SCH MG: 75 TABLET, FILM COATED ORAL at 08:13

## 2021-02-27 RX ADMIN — ENOXAPARIN SODIUM SCH MG: 30 INJECTION SUBCUTANEOUS at 08:21

## 2021-02-27 RX ADMIN — ASPIRIN 81 MG SCH MG: 81 TABLET ORAL at 08:13

## 2021-02-27 RX ADMIN — CARVEDILOL SCH MG: 12.5 TABLET, FILM COATED ORAL at 08:19

## 2021-02-27 RX ADMIN — CARVEDILOL SCH MG: 12.5 TABLET, FILM COATED ORAL at 21:09

## 2021-02-27 RX ADMIN — Medication SCH OZ: at 08:59

## 2021-02-27 RX ADMIN — Medication SCH EACH: at 07:30

## 2021-02-27 RX ADMIN — GABAPENTIN SCH MG: 300 CAPSULE ORAL at 21:09

## 2021-02-27 RX ADMIN — SODIUM CHLORIDE PRN MLS/HR: 9 INJECTION, SOLUTION INTRAVENOUS at 15:14

## 2021-02-27 RX ADMIN — MEGESTROL ACETATE SCH MG: 40 TABLET ORAL at 08:13

## 2021-02-27 RX ADMIN — MEGESTROL ACETATE SCH MG: 40 TABLET ORAL at 16:27

## 2021-02-27 RX ADMIN — Medication SCH OZ: at 09:00

## 2021-02-27 RX ADMIN — GABAPENTIN SCH MG: 100 CAPSULE ORAL at 08:14

## 2021-02-27 RX ADMIN — Medication SCH EACH: at 17:05

## 2021-02-27 RX ADMIN — DONEPEZIL HYDROCHLORIDE SCH MG: 5 TABLET ORAL at 08:13

## 2021-02-27 RX ADMIN — TAMSULOSIN HYDROCHLORIDE SCH MG: 0.4 CAPSULE ORAL at 21:09

## 2021-02-27 RX ADMIN — SODIUM CHLORIDE PRN MLS/HR: 9 INJECTION, SOLUTION INTRAVENOUS at 23:20

## 2021-02-27 RX ADMIN — Medication SCH GM: at 09:00

## 2021-02-27 RX ADMIN — LEVOTHYROXINE SODIUM SCH MCG: 25 TABLET ORAL at 08:13

## 2021-02-27 NOTE — NUR
MS RN CLOSING NOTE



PT RESTING IN BED. A/O X2 WITH CONFUSION. NO SIGNS OF DISTRESS PAIN OR DISCOMFORT, ON ROOM 
AIR, RESPIRATIONS UNLABORED, WITH LEFT FOREARM 20G IV PATENT, RUNNING NS 1000 @ 100ML/HR. 
WITHOUT COMPLICATIONS NOTED AT SITE. SAFETY MEASURES IN PLACE, BILATERAL SOFT WRIST 
RESTRAINTS IN PLACE AS ORDERED FOR SAFETY, VISUAL AND CIRCULATORY CHECKS DONE Q15M, BED 
ALARM ON, BED LOCKED AND IN LOW POSITION, SIDE RAILS UP X 2, WILL ENDORSE TO ONCOMING SHIFT.

## 2021-02-27 NOTE — NUR
RN NOTE

NO CHANGES OBSERVED THROUGHOUT SHIFT. CURRENTLY SLEEPING IN BED IN LYING POSITION. NO SIGNS 
OF DISTRESS PAIN OR DISCOMFORT, ON ROOM AIR, RESPIRATIONS UNLABORED, WITH LEFT FOREARM 20G 
IV FLUSHED AND PATENT WITHOUT COMPLICATIONS NOTED AT SITE. WITH NS @ 100ML/HOUR RUNNING AS 
ORDERED. SAFETY MEASURES IN PLACE, BILATERAL SOFT WRIST RESTRAINTS IN PLACE AS ORDERED FOR 
SAFETY,  VISUAL AND CIRCULATORY CHECKS DONE Q15M, BED ALARM ON, BED LOCKED AND IN LOW 
POSITION, SIDE RAILS UP X 2, WILL ENDORSE TO MORNING RN FOR TR.

## 2021-02-27 NOTE — NUR
RN NOTE



RECEIVED PATIENT RESTING IN BED. NO SIGNS OF DISTRESS PAIN OR DISCOMFORT, ON ROOM AIR, 
RESPIRATIONS UNLABORED, WITH LEFT FOREARM 20G IV FLUSHED AND PATENT WITHOUT COMPLICATIONS 
NOTED AT SITE. WITH NS @ 100ML/HOUR RUNNING AS ORDERED. SAFETY MEASURES IN PLACE, BILATERAL 
SOFT WRIST RESTRAINTS IN PLACE AS ORDERED FOR SAFETY,  NO SIGNS OF POOR CIRCULATION. BED 
ALARM ON, BED LOCKED AND IN LOW POSITION, SIDE RAILS UP X 2, WILL CONTINUE TO MONITOR.

## 2021-02-27 NOTE — NUR
RN NOTE

RECEIVED PT CURRENTLY SLEEPING IN BED WITH HEAD OF BED ELEVATED. PT WITHOUT SIGNS OF PAIN OR 
DISCOMFORT. ON ROOM AIR, RESPIRATIONS UNLABORED, WITH LEFT FOREARM 20G IV FLUSHED AND PATENT 
WITHOUT COMPLICATIONS NOTED AT SITE. WITH NS @ 100ML/HOUR RUNNING AS ORDERED.  SAFETY 
MEASURES IN PLACE PER PROTOCOL, BILATERAL SOFT WRIST RESTRAINTS IN PLACE AS ORDERED FOR 
SAFETY, WILL DO VISUAL AND CIRCULATORY CHECKS  Q15M, BED ALARM ON, BED LOCKED AND IN LOW 
POSITION, SIDE RAILS UP X 2, WILL MONITOR PT AND CARRY OUT ACTIVE MD ORDERS.

## 2021-02-28 VITALS — SYSTOLIC BLOOD PRESSURE: 129 MMHG | DIASTOLIC BLOOD PRESSURE: 70 MMHG

## 2021-02-28 VITALS — DIASTOLIC BLOOD PRESSURE: 60 MMHG | SYSTOLIC BLOOD PRESSURE: 116 MMHG

## 2021-02-28 VITALS — DIASTOLIC BLOOD PRESSURE: 69 MMHG | SYSTOLIC BLOOD PRESSURE: 143 MMHG

## 2021-02-28 LAB
ALBUMIN SERPL BCP-MCNC: 2.5 G/DL (ref 3.4–5)
ALP SERPL-CCNC: 46 U/L (ref 46–116)
ALT SERPL W P-5'-P-CCNC: 42 U/L (ref 12–78)
AST SERPL W P-5'-P-CCNC: 32 U/L (ref 15–37)
BASOPHILS # BLD AUTO: 0 /CMM (ref 0–0.2)
BASOPHILS NFR BLD AUTO: 0.2 % (ref 0–2)
BILIRUB SERPL-MCNC: 0.3 MG/DL (ref 0.2–1)
BUN SERPL-MCNC: 38 MG/DL (ref 7–18)
CALCIUM SERPL-MCNC: 8.5 MG/DL (ref 8.5–10.1)
CHLORIDE SERPL-SCNC: 109 MMOL/L (ref 98–107)
CO2 SERPL-SCNC: 23 MMOL/L (ref 21–32)
CREAT SERPL-MCNC: 1.6 MG/DL (ref 0.6–1.3)
EOSINOPHIL NFR BLD AUTO: 1.4 % (ref 0–6)
GLUCOSE SERPL-MCNC: 98 MG/DL (ref 74–106)
HCT VFR BLD AUTO: 26 % (ref 39–51)
HGB BLD-MCNC: 9 G/DL (ref 13.5–17.5)
LYMPHOCYTES NFR BLD AUTO: 0.8 /CMM (ref 0.8–4.8)
LYMPHOCYTES NFR BLD AUTO: 10.4 % (ref 20–44)
MAGNESIUM SERPL-MCNC: 1.7 MG/DL (ref 1.8–2.4)
MCHC RBC AUTO-ENTMCNC: 34 G/DL (ref 31–36)
MCV RBC AUTO: 101 FL (ref 80–96)
MONOCYTES NFR BLD AUTO: 0.4 /CMM (ref 0.1–1.3)
MONOCYTES NFR BLD AUTO: 5.6 % (ref 2–12)
NEUTROPHILS # BLD AUTO: 6.1 /CMM (ref 1.8–8.9)
NEUTROPHILS NFR BLD AUTO: 82.4 % (ref 43–81)
PHOSPHATE SERPL-MCNC: 3.3 MG/DL (ref 2.5–4.9)
PLATELET # BLD AUTO: 213 /CMM (ref 150–450)
POTASSIUM SERPL-SCNC: 4.4 MMOL/L (ref 3.5–5.1)
PROT SERPL-MCNC: 5.8 G/DL (ref 6.4–8.2)
RBC # BLD AUTO: 2.6 MIL/UL (ref 4.5–6)
SODIUM SERPL-SCNC: 144 MMOL/L (ref 136–145)
WBC NRBC COR # BLD AUTO: 7.4 K/UL (ref 4.3–11)

## 2021-02-28 RX ADMIN — GABAPENTIN SCH MG: 100 CAPSULE ORAL at 17:29

## 2021-02-28 RX ADMIN — DEXTROSE MONOHYDRATE SCH MLS/HR: 50 INJECTION, SOLUTION INTRAVENOUS at 23:12

## 2021-02-28 RX ADMIN — ENOXAPARIN SODIUM SCH MG: 30 INJECTION SUBCUTANEOUS at 09:11

## 2021-02-28 RX ADMIN — INSULIN HUMAN PRN UNIT: 100 INJECTION, SOLUTION PARENTERAL at 13:49

## 2021-02-28 RX ADMIN — ATORVASTATIN CALCIUM SCH MG: 40 TABLET, FILM COATED ORAL at 09:11

## 2021-02-28 RX ADMIN — CARVEDILOL SCH MG: 12.5 TABLET, FILM COATED ORAL at 21:32

## 2021-02-28 RX ADMIN — Medication SCH EACH: at 17:39

## 2021-02-28 RX ADMIN — ASPIRIN 81 MG SCH MG: 81 TABLET ORAL at 09:10

## 2021-02-28 RX ADMIN — Medication SCH EACH: at 13:48

## 2021-02-28 RX ADMIN — Medication SCH OZ: at 10:03

## 2021-02-28 RX ADMIN — Medication SCH GM: at 10:03

## 2021-02-28 RX ADMIN — MEGESTROL ACETATE SCH MG: 40 TABLET ORAL at 09:12

## 2021-02-28 RX ADMIN — Medication SCH EACH: at 21:52

## 2021-02-28 RX ADMIN — MEGESTROL ACETATE SCH MG: 40 TABLET ORAL at 17:29

## 2021-02-28 RX ADMIN — CLOPIDOGREL BISULFATE SCH MG: 75 TABLET, FILM COATED ORAL at 09:09

## 2021-02-28 RX ADMIN — INSULIN HUMAN PRN UNIT: 100 INJECTION, SOLUTION PARENTERAL at 09:11

## 2021-02-28 RX ADMIN — DONEPEZIL HYDROCHLORIDE SCH MG: 5 TABLET ORAL at 09:13

## 2021-02-28 RX ADMIN — TAMSULOSIN HYDROCHLORIDE SCH MG: 0.4 CAPSULE ORAL at 21:31

## 2021-02-28 RX ADMIN — GABAPENTIN SCH MG: 100 CAPSULE ORAL at 09:11

## 2021-02-28 RX ADMIN — HUMAN INSULIN PRN UNIT: 100 INJECTION, SOLUTION SUBCUTANEOUS at 21:59

## 2021-02-28 RX ADMIN — SODIUM CHLORIDE PRN MLS/HR: 9 INJECTION, SOLUTION INTRAVENOUS at 18:22

## 2021-02-28 RX ADMIN — GABAPENTIN SCH MG: 300 CAPSULE ORAL at 21:31

## 2021-02-28 RX ADMIN — LEVOTHYROXINE SODIUM SCH MCG: 25 TABLET ORAL at 09:12

## 2021-02-28 RX ADMIN — CARVEDILOL SCH MG: 12.5 TABLET, FILM COATED ORAL at 09:15

## 2021-02-28 RX ADMIN — MEMANTINE HYDROCHLORIDE SCH MG: 5 TABLET ORAL at 09:12

## 2021-02-28 RX ADMIN — Medication SCH EACH: at 09:06

## 2021-02-28 NOTE — NUR
PT RECEIVED IN BED, ALERT, CONFUSED AND ATTEMPTING TO PULL AT LINES. PT IS CALM AND NON 
COMBATIVE. RESPIRATIONS EVEN AND UNLABORED. NO S/S OF ACUTE DISTRESS. SAFETY MAINTAINED. PT 
STABLE AT THIS TIME.

## 2021-02-28 NOTE — NUR
MED SURG RN NOTES

Patient is alert and oriented x 1/2. He is breathing even and unlabored. on room air with 02 
sat 99 %. Bilateral soft wrist restraints noted with no s/s of skin breakdown. Left forearm 
20 gauze noted at 100 cc/hour N/S. Bed is in lowest and locked position. Call light with in 
reach. Will continue to monitor.

## 2021-02-28 NOTE — NUR
CALL PLACED TO NUBIA RAY. PT HAVING AUDITIORY WHEEZING. DENIES SOB AND DYSPNEA. 
RESPIRATIONS EVEN AND UNLABORED. NO S/S OF ACUTE DISTRESS. WILL CONTINUE TO MONITOR.

## 2021-02-28 NOTE — NUR
PRIMARY ASSESSMENT UNCHANGED. PT DOES ATTEMPT TO PULL AT EQUIPMENT. INCONTINENT OF BOWEL AND 
BLADDER. NO S/S OF SOB OR DYSPNEA. SAFETY MAINTAINED. PT STABLE AT THIS TIME.

## 2021-02-28 NOTE — NUR
PT RECEIVED IN BED, AOX1-2, BILAT WRIST RESTRAINTS ON, RESPIRATIONS EVEN AND UNLABORED. NS 
@100 INFUSING THROUGH #20 LFA. NO S/S OF ACUTE DISTRESS. CALL LIGHT WITHIN REACH, BED IN 
LOWEST POSITION AND ENCOURAGED TO CALL FOR ASSIST. WILL CONTINUE TO MONITOR.

## 2021-02-28 NOTE — NUR
MED SURG RN NOTES

Patient is alert and oriented x 1/2. He is breathing even and unlabored. on room air with 02 
sat 97%. Bilateral soft wrist restraints noted with no s/s of skin breakdown. Left forearm 
20 gauze noted at 100 cc/hour N/S. Bed is in lowest and locked position. Call light with in 
reach. Will continue to monitor.

## 2021-03-01 VITALS — SYSTOLIC BLOOD PRESSURE: 149 MMHG | DIASTOLIC BLOOD PRESSURE: 73 MMHG

## 2021-03-01 LAB
BASOPHILS # BLD AUTO: 0 /CMM (ref 0–0.2)
BASOPHILS NFR BLD AUTO: 0.2 % (ref 0–2)
BUN SERPL-MCNC: 44 MG/DL (ref 7–18)
CALCIUM SERPL-MCNC: 8.4 MG/DL (ref 8.5–10.1)
CHLORIDE SERPL-SCNC: 109 MMOL/L (ref 98–107)
CO2 SERPL-SCNC: 18 MMOL/L (ref 21–32)
CREAT SERPL-MCNC: 1.7 MG/DL (ref 0.6–1.3)
EOSINOPHIL NFR BLD AUTO: 0.1 % (ref 0–6)
GLUCOSE SERPL-MCNC: 109 MG/DL (ref 74–106)
HCT VFR BLD AUTO: 26 % (ref 39–51)
HGB BLD-MCNC: 9.1 G/DL (ref 13.5–17.5)
LYMPHOCYTES NFR BLD AUTO: 0.4 /CMM (ref 0.8–4.8)
LYMPHOCYTES NFR BLD AUTO: 6 % (ref 20–44)
MAGNESIUM SERPL-MCNC: 2 MG/DL (ref 1.8–2.4)
MCHC RBC AUTO-ENTMCNC: 34 G/DL (ref 31–36)
MCV RBC AUTO: 102 FL (ref 80–96)
MONOCYTES NFR BLD AUTO: 0.1 /CMM (ref 0.1–1.3)
MONOCYTES NFR BLD AUTO: 1.2 % (ref 2–12)
NEUTROPHILS # BLD AUTO: 6.8 /CMM (ref 1.8–8.9)
NEUTROPHILS NFR BLD AUTO: 92.5 % (ref 43–81)
PLATELET # BLD AUTO: 209 /CMM (ref 150–450)
POTASSIUM SERPL-SCNC: 5.3 MMOL/L (ref 3.5–5.1)
RBC # BLD AUTO: 2.59 MIL/UL (ref 4.5–6)
SODIUM SERPL-SCNC: 142 MMOL/L (ref 136–145)
WBC NRBC COR # BLD AUTO: 7.4 K/UL (ref 4.3–11)

## 2021-03-01 PROCEDURE — 0JBQ0ZZ EXCISION OF RIGHT FOOT SUBCUTANEOUS TISSUE AND FASCIA, OPEN APPROACH: ICD-10-PCS | Performed by: STUDENT IN AN ORGANIZED HEALTH CARE EDUCATION/TRAINING PROGRAM

## 2021-03-01 RX ADMIN — Medication SCH OZ: at 09:00

## 2021-03-01 RX ADMIN — GABAPENTIN SCH MG: 100 CAPSULE ORAL at 16:25

## 2021-03-01 RX ADMIN — HUMAN INSULIN PRN UNIT: 100 INJECTION, SOLUTION SUBCUTANEOUS at 16:48

## 2021-03-01 RX ADMIN — CLOPIDOGREL BISULFATE SCH MG: 75 TABLET, FILM COATED ORAL at 09:07

## 2021-03-01 RX ADMIN — ASPIRIN 81 MG SCH MG: 81 TABLET ORAL at 09:05

## 2021-03-01 RX ADMIN — GABAPENTIN SCH MG: 100 CAPSULE ORAL at 09:05

## 2021-03-01 RX ADMIN — HUMAN INSULIN PRN UNIT: 100 INJECTION, SOLUTION SUBCUTANEOUS at 11:43

## 2021-03-01 RX ADMIN — HUMAN INSULIN PRN UNIT: 100 INJECTION, SOLUTION SUBCUTANEOUS at 09:16

## 2021-03-01 RX ADMIN — SODIUM CHLORIDE PRN MLS/HR: 9 INJECTION, SOLUTION INTRAVENOUS at 06:10

## 2021-03-01 RX ADMIN — MEGESTROL ACETATE SCH MG: 40 TABLET ORAL at 09:06

## 2021-03-01 RX ADMIN — CARVEDILOL SCH MG: 12.5 TABLET, FILM COATED ORAL at 20:43

## 2021-03-01 RX ADMIN — Medication SCH GM: at 09:00

## 2021-03-01 RX ADMIN — ALBUTEROL SULFATE SCH MG: 2.5 SOLUTION RESPIRATORY (INHALATION) at 20:25

## 2021-03-01 RX ADMIN — Medication SCH EACH: at 07:57

## 2021-03-01 RX ADMIN — Medication SCH EACH: at 22:05

## 2021-03-01 RX ADMIN — MEMANTINE HYDROCHLORIDE SCH MG: 5 TABLET ORAL at 09:07

## 2021-03-01 RX ADMIN — Medication SCH EACH: at 12:55

## 2021-03-01 RX ADMIN — ENOXAPARIN SODIUM SCH MG: 30 INJECTION SUBCUTANEOUS at 09:13

## 2021-03-01 RX ADMIN — HUMAN INSULIN PRN UNIT: 100 INJECTION, SOLUTION SUBCUTANEOUS at 22:07

## 2021-03-01 RX ADMIN — Medication SCH EACH: at 17:15

## 2021-03-01 RX ADMIN — GABAPENTIN SCH MG: 300 CAPSULE ORAL at 21:43

## 2021-03-01 RX ADMIN — MEGESTROL ACETATE SCH MG: 40 TABLET ORAL at 16:25

## 2021-03-01 RX ADMIN — LEVOTHYROXINE SODIUM SCH MCG: 25 TABLET ORAL at 07:46

## 2021-03-01 RX ADMIN — TAMSULOSIN HYDROCHLORIDE SCH MG: 0.4 CAPSULE ORAL at 21:43

## 2021-03-01 RX ADMIN — SODIUM CHLORIDE PRN MLS/HR: 9 INJECTION, SOLUTION INTRAVENOUS at 16:25

## 2021-03-01 RX ADMIN — CARVEDILOL SCH MG: 12.5 TABLET, FILM COATED ORAL at 09:08

## 2021-03-01 RX ADMIN — DEXTROSE MONOHYDRATE SCH MLS/HR: 50 INJECTION, SOLUTION INTRAVENOUS at 23:43

## 2021-03-01 RX ADMIN — DONEPEZIL HYDROCHLORIDE SCH MG: 5 TABLET ORAL at 09:06

## 2021-03-01 RX ADMIN — ATORVASTATIN CALCIUM SCH MG: 40 TABLET, FILM COATED ORAL at 09:06

## 2021-03-01 NOTE — NUR
RN TELE NOTE

NOTIFIED RT OF INSPIRATORY WHEEZES AND IF THEY COULD ADMINISTER PRN ALBUTEROL VIA NEB.

## 2021-03-01 NOTE — NUR
RN NOTE



RECEIVED PT IN BED ALERT, CONFUSED. REORIENTED TO TIME PLACE AND SITUATION. AUDIBLE WHEEZING 
NOTED. NO DISTRESS NOTED. O2 SAT AT 96 %. PT WITH BILATERAL WRIST RESTRAINTS, SKIN AND 
CIRCULATION GOOD. WOUND DRESSINGS CLEAN DRY AND INTACT. NO BLEEDING NOTED FROM S/P 
DEBRIDEMENT. LFA IV PATENT AND INTACT WITH NS RUNNING AT 75 ML/HR. NO SIGNS OF INFILTRATION 
NOTED. ALL SAFETY MEASURES IMPLEMENTED PER PROTOCOL. JESUS LIGHT WITHIN REACH. BED LOCKED IN 
LOWEST POSITION, SIDE RAILS UP.

## 2021-03-01 NOTE — NUR
RN OPENING NOTE

PATIENT IS IN BED WITH HOB AT SEMI AMBROCIO'S POSITION. PATIENT IS CURRENTLY ON ROOM AIR WITH 
WHEEZES ON INSPIRATION. PATIENT IS AOX1 AND CONFUSED. L MEDIAL FOOT, SACRAL STAGE 3, RHEEL 
DTI, LHEEL DIABETIC ULCER HAVE BEEN NOTED. #20 LFA HAS IS PATENT, INTACT, AND HAS NO SIGNS 
OF INFILTRATION. BILATERAL SOFT WRIST RESTRAINTS ARE APPLIED. BED IS LOCKED IN THE LOWEST 
POSITION, CALL BELL WITHIN REACH, 4 GUARD RAILS RAISED, AND ALL HOSPITAL SAFETY PRECAUTIONS 
ARE BEING FOLLOWED. WILL CONTINUE TO MONITOR THROUGHOUT SHIFT.

## 2021-03-01 NOTE — NUR
PT IS ALERT AND VERY CONFUSED. WHEN ATTEMPTED TO REMOVE FOOT DSG, PT GOT AGITATED AND BEGAN 
TO KICK. PT REFUSED PHOTOS. NOTIFIED BRANDON RAY OF PT'S AUDITORY WHEEZING,. GAVE SOLU 
MEDROL  410 MG IVP X1 DOSE AS ORDERED. PT RESPONDED WELL TO MED. 0630  REPEATED NOTIFICATION 
OF AUDITORY WHEEZING AND  ORDERED TO HOLD IVF. RESPIRATIONS CONTINUE TO EVEN AND UNLABORED. 
NO SOB NOTED. PT DENIES RESPIRATORY DISCOMFORT. HOB ELEVATED . BILAT WRIST RESTRAINTS IN 
PLACE

-------------------------------------------------------------------------------

Addendum: 03/01/21 at 0635 by GIGI LIU RN

-------------------------------------------------------------------------------

CALL LIGHT WITHIN REACH. BED IN LOWEST POSITION, AND SAFETY MAINTAINED FOR STAFF AND 
PATIENT. WILL CONTINUE TO MONITOR

## 2021-03-01 NOTE — NUR
RN CLOSING NOTE

PATIENT IS IN BED WITH HOB AT SEMI AMBROCIO'S POSITION. PATIENT IS CURRENTLY ON ROOM AIR WITH 
NO SIGNS OF DISTRESS. PATIENT IS AOX1 AND CONFUSED. L MEDIAL FOOT, SACRAL STAGE 3, RHEEL 
DTI, LHEEL DIABETIC ULCER HAVE APPROPRIATE DRESSINGS APPLIED. #20 LFA HAS IS PATENT, INTACT, 
AND HAS NO SIGNS OF INFILTRATION. BILATERAL SOFT WRIST RESTRAINTS ARE APPLIED. BED IS LOCKED 
IN THE LOWEST POSITION, CALL BELL WITHIN REACH, 4 GUARD RAILS RAISED, AND ALL HOSPITAL 
SAFETY PRECAUTIONS ARE BEING FOLLOWED. WILL ENDORSE TO NIGHT SHIFT RN.

## 2021-03-02 VITALS — SYSTOLIC BLOOD PRESSURE: 118 MMHG | DIASTOLIC BLOOD PRESSURE: 63 MMHG

## 2021-03-02 VITALS — SYSTOLIC BLOOD PRESSURE: 163 MMHG | DIASTOLIC BLOOD PRESSURE: 69 MMHG

## 2021-03-02 VITALS — DIASTOLIC BLOOD PRESSURE: 53 MMHG | SYSTOLIC BLOOD PRESSURE: 113 MMHG

## 2021-03-02 VITALS — SYSTOLIC BLOOD PRESSURE: 126 MMHG | DIASTOLIC BLOOD PRESSURE: 57 MMHG

## 2021-03-02 LAB
ALBUMIN SERPL BCP-MCNC: 2.5 G/DL (ref 3.4–5)
ALP SERPL-CCNC: 45 U/L (ref 46–116)
ALT SERPL W P-5'-P-CCNC: 41 U/L (ref 12–78)
AST SERPL W P-5'-P-CCNC: 30 U/L (ref 15–37)
BILIRUB SERPL-MCNC: 0.2 MG/DL (ref 0.2–1)
BUN SERPL-MCNC: 52 MG/DL (ref 7–18)
CALCIUM SERPL-MCNC: 8.3 MG/DL (ref 8.5–10.1)
CHLORIDE SERPL-SCNC: 113 MMOL/L (ref 98–107)
CO2 SERPL-SCNC: 23 MMOL/L (ref 21–32)
CREAT SERPL-MCNC: 1.7 MG/DL (ref 0.6–1.3)
GLUCOSE SERPL-MCNC: 179 MG/DL (ref 74–106)
POTASSIUM SERPL-SCNC: 4.4 MMOL/L (ref 3.5–5.1)
PROT SERPL-MCNC: 5.6 G/DL (ref 6.4–8.2)
SODIUM SERPL-SCNC: 145 MMOL/L (ref 136–145)

## 2021-03-02 RX ADMIN — ALBUTEROL SULFATE SCH MG: 2.5 SOLUTION RESPIRATORY (INHALATION) at 04:10

## 2021-03-02 RX ADMIN — Medication SCH EACH: at 07:30

## 2021-03-02 RX ADMIN — LEVOTHYROXINE SODIUM SCH MCG: 25 TABLET ORAL at 09:22

## 2021-03-02 RX ADMIN — Medication SCH EACH: at 21:27

## 2021-03-02 RX ADMIN — CARVEDILOL SCH MG: 12.5 TABLET, FILM COATED ORAL at 20:51

## 2021-03-02 RX ADMIN — GABAPENTIN SCH MG: 100 CAPSULE ORAL at 09:21

## 2021-03-02 RX ADMIN — MEMANTINE HYDROCHLORIDE SCH MG: 5 TABLET ORAL at 09:22

## 2021-03-02 RX ADMIN — ATORVASTATIN CALCIUM SCH MG: 40 TABLET, FILM COATED ORAL at 09:22

## 2021-03-02 RX ADMIN — ASPIRIN 81 MG SCH MG: 81 TABLET ORAL at 09:21

## 2021-03-02 RX ADMIN — Medication SCH OZ: at 09:32

## 2021-03-02 RX ADMIN — Medication SCH GM: at 09:32

## 2021-03-02 RX ADMIN — HUMAN INSULIN PRN UNIT: 100 INJECTION, SOLUTION SUBCUTANEOUS at 21:29

## 2021-03-02 RX ADMIN — DONEPEZIL HYDROCHLORIDE SCH MG: 5 TABLET ORAL at 09:22

## 2021-03-02 RX ADMIN — CARVEDILOL SCH MG: 12.5 TABLET, FILM COATED ORAL at 09:23

## 2021-03-02 RX ADMIN — ALBUTEROL SULFATE SCH MG: 2.5 SOLUTION RESPIRATORY (INHALATION) at 01:19

## 2021-03-02 RX ADMIN — ALBUTEROL SULFATE SCH MG: 2.5 SOLUTION RESPIRATORY (INHALATION) at 23:30

## 2021-03-02 RX ADMIN — ENOXAPARIN SODIUM SCH MG: 30 INJECTION SUBCUTANEOUS at 09:26

## 2021-03-02 RX ADMIN — GABAPENTIN SCH MG: 100 CAPSULE ORAL at 17:24

## 2021-03-02 RX ADMIN — INSULIN HUMAN PRN UNIT: 100 INJECTION, SOLUTION PARENTERAL at 17:26

## 2021-03-02 RX ADMIN — SODIUM CHLORIDE PRN MLS/HR: 9 INJECTION, SOLUTION INTRAVENOUS at 05:02

## 2021-03-02 RX ADMIN — Medication SCH EACH: at 17:27

## 2021-03-02 RX ADMIN — HUMAN INSULIN PRN UNIT: 100 INJECTION, SOLUTION SUBCUTANEOUS at 12:46

## 2021-03-02 RX ADMIN — ALBUTEROL SULFATE SCH MG: 2.5 SOLUTION RESPIRATORY (INHALATION) at 15:45

## 2021-03-02 RX ADMIN — ACETYLCYSTEINE SCH MG: 200 INHALANT RESPIRATORY (INHALATION) at 23:30

## 2021-03-02 RX ADMIN — MEGESTROL ACETATE SCH MG: 40 TABLET ORAL at 09:26

## 2021-03-02 RX ADMIN — ALBUTEROL SULFATE SCH MG: 2.5 SOLUTION RESPIRATORY (INHALATION) at 11:47

## 2021-03-02 RX ADMIN — ALBUTEROL SULFATE SCH MG: 2.5 SOLUTION RESPIRATORY (INHALATION) at 19:25

## 2021-03-02 RX ADMIN — GABAPENTIN SCH MG: 300 CAPSULE ORAL at 21:25

## 2021-03-02 RX ADMIN — CLOPIDOGREL BISULFATE SCH MG: 75 TABLET, FILM COATED ORAL at 09:31

## 2021-03-02 RX ADMIN — ACETYLCYSTEINE SCH MG: 200 INHALANT RESPIRATORY (INHALATION) at 15:45

## 2021-03-02 RX ADMIN — ALBUTEROL SULFATE SCH MG: 2.5 SOLUTION RESPIRATORY (INHALATION) at 08:01

## 2021-03-02 RX ADMIN — MEGESTROL ACETATE SCH MG: 40 TABLET ORAL at 17:24

## 2021-03-02 RX ADMIN — Medication SCH EACH: at 12:58

## 2021-03-02 RX ADMIN — SODIUM CHLORIDE PRN MLS/HR: 9 INJECTION, SOLUTION INTRAVENOUS at 16:43

## 2021-03-02 RX ADMIN — TAMSULOSIN HYDROCHLORIDE SCH MG: 0.4 CAPSULE ORAL at 21:25

## 2021-03-02 NOTE — NUR
RN NOTES





PT REMAINS IN BED, NO ACUTE CHANGES NOTED. NO RESP DISTRESS. TOLERATING ROOM AIR. PT 
RECEIVING ALBUTEROL Q4H FOR WHEEZING. CONTINUE ON IVF NS RUNNING  ML/HR. NO SIGNS OF 
INFILTRATION NOTED. ALL SAFETY MEASURES MAINTAINED. WILL ENDORSE TO NEXT SHIFT NURSE FOR 
TR.

## 2021-03-02 NOTE — NUR
RN NOTE



SPOKE WITH DAUGHTER DEANNE, ALL QUESTIONS ANSWERED TO HER COMPLETE SATISFACTION REGARDING PT 
CARE/STATUS

## 2021-03-02 NOTE — NUR
RN OPENING NOTE,



PATIENT  A/Ox1 AND CONFUSED,  ON ROOM AIR NO  SOB/ACUTE RESP DISTRESS RO SOB, BUT WHEEZES 
NOTED, RT TO GIVE BREATHING TREATMENT AT THIS TIME,  #20 LFA AND RFA #20 SL, BOTH LINES 
PATENT AND INTACT, ON BILATERAL SOFT WRIST RESTRAINTS, NO CIRCULATION COMPROMISED OR 
ABNORMALITY NOTED AT SITE,  BED  LOCKED AND  IN  LOWEST POSITION, CALL RIGHT WITHIN REACH, 
S/R X2 UP,  ALL SAFETY PRECAUTIONS IMPLEMENTED, WILL CONTINUE  TO MONITOR CLOSELY

## 2021-03-02 NOTE — NUR
RN CLOSING NOTE



NO CHANGES TO PT STATUS, PT IN STABLE CONDITION. AUDIBLE WHEEZING STILL, BUT SPO2 97%, NO 
SIGNS OF RESP DISTRESS OR SOB. ALL PT SAFETY PRECAUTION IN PLACE. WILL ENDORSE TR TO 
ONCOMING RN

## 2021-03-02 NOTE — NUR
RN OPENING NOTE



PATIENT IN BED SEMI AMBROCIO'S, A/Ox1 AND CONFUSED, WILL REORIENT EVERY TIME IM WITH PT. PT ON 
ROOM AIR WITH NO SIGNS OF RESP DISTRESS RO SOB. L MEDIAL FOOT, SACRAL STAGE 3, RHEEL DTI, 
LHEEL DIABETIC ULCER HAVE APPROPRIATE DRESSINGS APPLIED. #20 LFA HAS IS RUNNING NS @ 
100ML/HR, RFA #20 SL, BOTH LINES FLUSHED, PATENT, INTACT, AND HAS NO SIGNS OF 
INFECTION/INFILTRATION. BILATERAL SOFT WRIST RESTRAINTS ARE APPLIED, BILAT CMS INTACT. BED 
IS LOCKED IN THE LOWEST POSITION, CALL BELL WITHIN REACH, 4 GUARD RAILS RAISED, AND ALL 
HOSPITAL SAFETY PRECAUTIONS ARE BEING FOLLOWED. WILL CONT TO MONITOR

## 2021-03-02 NOTE — NUR
RN NOTES,



INFORMED DR MARQUEZ ON CALL THAT PATIENT WITH AUDIBLE  WHEEZES  AND O2 85% AT RA, AND 
MD REPLIED WITH  ORDER TO DO CXR AND GIVE THE BREATHING TREATMENT NOW, NOTED AND CARRIED 
OUT, PATIENT ON 3LPM AT THIS TIME, AND IVF ON HOLD,WILL CONTINUE TO MONITOR CLOSELY.

## 2021-03-03 VITALS — SYSTOLIC BLOOD PRESSURE: 111 MMHG | DIASTOLIC BLOOD PRESSURE: 52 MMHG

## 2021-03-03 VITALS — SYSTOLIC BLOOD PRESSURE: 115 MMHG | DIASTOLIC BLOOD PRESSURE: 65 MMHG

## 2021-03-03 VITALS — DIASTOLIC BLOOD PRESSURE: 76 MMHG | SYSTOLIC BLOOD PRESSURE: 135 MMHG

## 2021-03-03 VITALS — SYSTOLIC BLOOD PRESSURE: 111 MMHG | DIASTOLIC BLOOD PRESSURE: 71 MMHG

## 2021-03-03 VITALS — SYSTOLIC BLOOD PRESSURE: 128 MMHG | DIASTOLIC BLOOD PRESSURE: 65 MMHG

## 2021-03-03 VITALS — DIASTOLIC BLOOD PRESSURE: 76 MMHG | SYSTOLIC BLOOD PRESSURE: 116 MMHG

## 2021-03-03 VITALS — SYSTOLIC BLOOD PRESSURE: 103 MMHG | DIASTOLIC BLOOD PRESSURE: 63 MMHG

## 2021-03-03 VITALS — DIASTOLIC BLOOD PRESSURE: 59 MMHG | SYSTOLIC BLOOD PRESSURE: 120 MMHG

## 2021-03-03 LAB
BASOPHILS # BLD AUTO: 0 /CMM (ref 0–0.2)
BASOPHILS NFR BLD AUTO: 0.3 % (ref 0–2)
BUN SERPL-MCNC: 60 MG/DL (ref 7–18)
CALCIUM SERPL-MCNC: 7.9 MG/DL (ref 8.5–10.1)
CHLORIDE SERPL-SCNC: 112 MMOL/L (ref 98–107)
CO2 SERPL-SCNC: 26 MMOL/L (ref 21–32)
CREAT SERPL-MCNC: 2 MG/DL (ref 0.6–1.3)
EOSINOPHIL NFR BLD AUTO: 2.6 % (ref 0–6)
GLUCOSE SERPL-MCNC: 179 MG/DL (ref 74–106)
HCT VFR BLD AUTO: 23 % (ref 39–51)
HGB BLD-MCNC: 7.7 G/DL (ref 13.5–17.5)
LYMPHOCYTES NFR BLD AUTO: 0.7 /CMM (ref 0.8–4.8)
LYMPHOCYTES NFR BLD AUTO: 11.3 % (ref 20–44)
MAGNESIUM SERPL-MCNC: 2.2 MG/DL (ref 1.8–2.4)
MCHC RBC AUTO-ENTMCNC: 34 G/DL (ref 31–36)
MCV RBC AUTO: 102 FL (ref 80–96)
MONOCYTES NFR BLD AUTO: 0.7 /CMM (ref 0.1–1.3)
MONOCYTES NFR BLD AUTO: 10.1 % (ref 2–12)
NEUTROPHILS # BLD AUTO: 4.9 /CMM (ref 1.8–8.9)
NEUTROPHILS NFR BLD AUTO: 75.7 % (ref 43–81)
PHOSPHATE SERPL-MCNC: 4.5 MG/DL (ref 2.5–4.9)
PLATELET # BLD AUTO: 211 /CMM (ref 150–450)
POTASSIUM SERPL-SCNC: 5.2 MMOL/L (ref 3.5–5.1)
RBC # BLD AUTO: 2.21 MIL/UL (ref 4.5–6)
SODIUM SERPL-SCNC: 143 MMOL/L (ref 136–145)
WBC NRBC COR # BLD AUTO: 6.5 K/UL (ref 4.3–11)

## 2021-03-03 PROCEDURE — 30233N1 TRANSFUSION OF NONAUTOLOGOUS RED BLOOD CELLS INTO PERIPHERAL VEIN, PERCUTANEOUS APPROACH: ICD-10-PCS | Performed by: INTERNAL MEDICINE

## 2021-03-03 RX ADMIN — INSULIN HUMAN PRN UNIT: 100 INJECTION, SOLUTION PARENTERAL at 18:15

## 2021-03-03 RX ADMIN — Medication SCH EACH: at 07:30

## 2021-03-03 RX ADMIN — Medication SCH EACH: at 18:18

## 2021-03-03 RX ADMIN — Medication SCH OZ: at 09:19

## 2021-03-03 RX ADMIN — CLOPIDOGREL BISULFATE SCH MG: 75 TABLET, FILM COATED ORAL at 09:20

## 2021-03-03 RX ADMIN — ACETYLCYSTEINE SCH MG: 200 INHALANT RESPIRATORY (INHALATION) at 08:08

## 2021-03-03 RX ADMIN — ATORVASTATIN CALCIUM SCH MG: 40 TABLET, FILM COATED ORAL at 09:16

## 2021-03-03 RX ADMIN — ALBUTEROL SULFATE SCH MG: 2.5 SOLUTION RESPIRATORY (INHALATION) at 14:50

## 2021-03-03 RX ADMIN — DONEPEZIL HYDROCHLORIDE SCH MG: 5 TABLET ORAL at 09:16

## 2021-03-03 RX ADMIN — ACETYLCYSTEINE SCH MG: 200 INHALANT RESPIRATORY (INHALATION) at 23:47

## 2021-03-03 RX ADMIN — MEGESTROL ACETATE SCH MG: 40 TABLET ORAL at 18:16

## 2021-03-03 RX ADMIN — ALBUTEROL SULFATE SCH MG: 2.5 SOLUTION RESPIRATORY (INHALATION) at 08:08

## 2021-03-03 RX ADMIN — TAMSULOSIN HYDROCHLORIDE SCH MG: 0.4 CAPSULE ORAL at 21:00

## 2021-03-03 RX ADMIN — INSULIN HUMAN PRN UNIT: 100 INJECTION, SOLUTION PARENTERAL at 12:40

## 2021-03-03 RX ADMIN — ALBUTEROL SULFATE SCH MG: 2.5 SOLUTION RESPIRATORY (INHALATION) at 20:28

## 2021-03-03 RX ADMIN — ALBUTEROL SULFATE SCH MG: 2.5 SOLUTION RESPIRATORY (INHALATION) at 03:40

## 2021-03-03 RX ADMIN — ALBUTEROL SULFATE SCH MG: 2.5 SOLUTION RESPIRATORY (INHALATION) at 23:47

## 2021-03-03 RX ADMIN — ENOXAPARIN SODIUM SCH MG: 30 INJECTION SUBCUTANEOUS at 09:20

## 2021-03-03 RX ADMIN — Medication SCH EACH: at 21:15

## 2021-03-03 RX ADMIN — LEVOTHYROXINE SODIUM SCH MCG: 25 TABLET ORAL at 09:15

## 2021-03-03 RX ADMIN — GABAPENTIN SCH MG: 100 CAPSULE ORAL at 09:16

## 2021-03-03 RX ADMIN — ALBUTEROL SULFATE SCH MG: 2.5 SOLUTION RESPIRATORY (INHALATION) at 11:42

## 2021-03-03 RX ADMIN — GABAPENTIN SCH MG: 100 CAPSULE ORAL at 18:16

## 2021-03-03 RX ADMIN — HUMAN INSULIN PRN UNIT: 100 INJECTION, SOLUTION SUBCUTANEOUS at 09:08

## 2021-03-03 RX ADMIN — CARVEDILOL SCH MG: 12.5 TABLET, FILM COATED ORAL at 20:52

## 2021-03-03 RX ADMIN — CARVEDILOL SCH MG: 12.5 TABLET, FILM COATED ORAL at 09:18

## 2021-03-03 RX ADMIN — ACETYLCYSTEINE SCH MG: 200 INHALANT RESPIRATORY (INHALATION) at 14:50

## 2021-03-03 RX ADMIN — HUMAN INSULIN PRN UNIT: 100 INJECTION, SOLUTION SUBCUTANEOUS at 21:16

## 2021-03-03 RX ADMIN — GABAPENTIN SCH MG: 300 CAPSULE ORAL at 21:00

## 2021-03-03 RX ADMIN — Medication SCH EACH: at 12:40

## 2021-03-03 RX ADMIN — Medication SCH GM: at 09:19

## 2021-03-03 RX ADMIN — MEGESTROL ACETATE SCH MG: 40 TABLET ORAL at 09:17

## 2021-03-03 RX ADMIN — MEMANTINE HYDROCHLORIDE SCH MG: 5 TABLET ORAL at 09:16

## 2021-03-03 RX ADMIN — ASPIRIN 81 MG SCH MG: 81 TABLET ORAL at 09:16

## 2021-03-03 NOTE — NUR
RN CLOSING NOTE,

PATIENT  A/Ox1 AND CONFUSED,  AT 2LPM VIA NC WITH O2 WNL, WITH ONE EPISODE OF DESATURATION  
LAST NIGHT, PLACED PATIENT ON NC 2LPM, NO SOB/ACUTE RESP DISTRESS AT THIS TIME, BUT CONTINUE 
WITH WHEEZES,  BREATHING TREATMENTS ADMINISTERED AS ORDERED, ON BILATERAL SOFT WRIST 
RESTRAINTS, NO CIRCULATION COMPROMISED OR ABNORMALITY NOTED AT SITE,  BED  LOCKED AND  IN  
LOWEST POSITION, CALL RIGHT WITHIN REACH, S/R X2 UP,  ALL SAFETY PRECAUTIONS IMPLEMENTED, 
WILL ENDORSE CONTINUITY OF CARE TO ONCOMING NURSE.

## 2021-03-03 NOTE — NUR
RN CLOSING NOTE



1 UNIT PRBC TRANSFUSION COMPLETE. PT TOLERATED WELL WITH NO SYMPTOMS OF REACTION, VITALS ALL 
WNL, PT IN STABLE CONDITION. NO CHANGES TO PT STATUS. PT ON NC 2LPM, NO SIGNS OF RESP 
DISTRESS OR SOB, SPO2 96%, ALTHOUGH PT STILL WHEEZING, IN HIGH FOWLERS. ALL SAFETY 
PRECAUTIONS IN PLACE. TR ENDORSED TO NIGHT SHIFT RN

## 2021-03-03 NOTE — NUR
RN NOTE



CONFIRMED WITH NIRMAL BENNETT NP, OK TO GIVE LOVENOX AND PLAVIX W SLIGHT DECREASE IN H/H FROM 
YESTERDAY. NO SIGNS OF ACTIVE BLEED

## 2021-03-03 NOTE — NUR
RN NOTE



RECEIVED PT IN BED, S/P BLOOD TRANSFUSION. NO S/SX OF REACTION NOTED. PT DENIES PAIN. ON O2 
AT 2LPM SATING AT 96 %. WHEEZING NOTED. NO RESP DISTRESS NOTED. KEPT HOB ELEVATED. WOUND 
DRESSINGS CLEAN DRY AND INTACT. WITH BILATERAL WRIST SOFT RESTRAINTS, WITH GOOD SKIN AND 
CIRCULATION. IV ON LFA LEAKING WHEN FLUSHED, DC'D, NO SIGNS OF INFECTION NOTED. IV ON RFA 
PATENT AND INTACT. ALL SAFETY MEASURES IMPLEMENTED PER PROTOCOL, CALL LIGHT WITHIN REACH, 
BED LOCKED IN LOWEST POSITION. SIDE RAILS UP. WILL CONTINUE TO MONITOR.

## 2021-03-03 NOTE — NUR
RN NOTES,

RELAYED RESULTS FOR CXR TO DR BOND AND REPLIED WITH NEW ORDER TO GIVE LASIX IV 2OMG  X ONE AT 
THIS TIME, NOTED AND CARRIED OUT.

## 2021-03-03 NOTE — NUR
RN OPENING NOTE



PATIENT IN BED HIGH AMBROCIO'S, A/Ox1 AND CONFUSED, WILL REORIENT EVERY TIME IM WITH PT. PT ON 
NC 2LPM, CURRENTLY RECEIVING BREATHING TREATMENT FROM RT, WITH NO SIGNS OF RESP DISTRESS OR 
SOB. L MEDIAL FOOT, SACRAL STAGE 3, RHEEL DTI, LHEEL DIABETIC ULCER HAVE APPROPRIATE 
DRESSINGS APPLIED. #20 LFA NS TKO, RFA #20 SL, BOTH LINES FLUSHED, PATENT, INTACT, AND NO 
SIGNS OF INFECTION/INFILTRATION. BILATERAL SOFT WRIST RESTRAINTS ARE APPLIED, BILAT CMS 
INTACT. BED IS LOCKED IN THE LOWEST POSITION, CALL BELL WITHIN REACH, 4 GUARD RAILS RAISED, 
AND ALL HOSPITAL SAFETY PRECAUTIONS ARE BEING FOLLOWED. WILL CONT TO MONITOR

## 2021-03-04 VITALS — SYSTOLIC BLOOD PRESSURE: 121 MMHG | DIASTOLIC BLOOD PRESSURE: 57 MMHG

## 2021-03-04 VITALS — SYSTOLIC BLOOD PRESSURE: 118 MMHG | DIASTOLIC BLOOD PRESSURE: 61 MMHG

## 2021-03-04 RX ADMIN — GABAPENTIN SCH MG: 100 CAPSULE ORAL at 08:36

## 2021-03-04 RX ADMIN — CARVEDILOL SCH MG: 12.5 TABLET, FILM COATED ORAL at 08:37

## 2021-03-04 RX ADMIN — MEMANTINE HYDROCHLORIDE SCH MG: 5 TABLET ORAL at 08:38

## 2021-03-04 RX ADMIN — ACETYLCYSTEINE SCH MG: 200 INHALANT RESPIRATORY (INHALATION) at 07:38

## 2021-03-04 RX ADMIN — ALBUTEROL SULFATE SCH MG: 2.5 SOLUTION RESPIRATORY (INHALATION) at 07:38

## 2021-03-04 RX ADMIN — Medication SCH EACH: at 08:36

## 2021-03-04 RX ADMIN — MEGESTROL ACETATE SCH MG: 40 TABLET ORAL at 08:37

## 2021-03-04 RX ADMIN — ASPIRIN 81 MG SCH MG: 81 TABLET ORAL at 08:38

## 2021-03-04 RX ADMIN — DONEPEZIL HYDROCHLORIDE SCH MG: 5 TABLET ORAL at 08:38

## 2021-03-04 RX ADMIN — LEVOTHYROXINE SODIUM SCH MCG: 25 TABLET ORAL at 08:36

## 2021-03-04 RX ADMIN — ATORVASTATIN CALCIUM SCH MG: 40 TABLET, FILM COATED ORAL at 08:37

## 2021-03-04 RX ADMIN — CLOPIDOGREL BISULFATE SCH MG: 75 TABLET, FILM COATED ORAL at 08:38

## 2021-03-04 RX ADMIN — ALBUTEROL SULFATE SCH MG: 2.5 SOLUTION RESPIRATORY (INHALATION) at 03:13

## 2021-03-04 NOTE — NUR
RN NOTE



PT IN BED SLEEPING, EASILY AROUSABLE BY TOUCH STIMULI. NO ACUTE CHANGES NOTED. CONTINUE ON 
O2 VIA NC AT 2LPM. NO RESP DISTRESS NOTED. RESTRAINTS REMAIN RELEASED. REPOSITIONED Q2H. 
WOUND DRESSINGS  DRY AND INTACT. NO S/SX OF HYPO/HYPERGLYCEMIA NOTED. ALL SAFETY MEASURES 
MAINTAINED. PT TO BE D/C TODAY. WILL ENDORSE TO NEXT SHIFT NURSE.

## 2021-03-04 NOTE — NUR
RN NOTE



PT SLEEPING COMFORTABLY, NO DISTRESS NOTED. BREATHING TX GIVEN BY RT Q4H. STILL OFF W/ 
RESTRAINTS, NO DISRUPTION NOTED, NO PULING OUT OR REMOVING LINES NOTED. WILL CONTINUE TO 
MONITOR.

## 2021-03-04 NOTE — NUR
RN NOTE



PT SLEEPING, RELEASED FROM BILATERAL WRIST RESTRAINTS. GOOD CIRCULATION, NO SKIN BREAKDOWN 
NOTED WILL CONTINUE TO MONITOR.

## 2021-03-04 NOTE — NUR
[facility] RN SANIYA NOTIFIED FOR REPORT. REPORT GIVEN TO EMS FOR TRANSPORT. PT 
ON 2L O2 NO RESPIRATORY DISTRESS. ALL BELONGINGS SIGNED AND GIVEN WITH PATIENT. ALL IV AND 
WRIST BANDS REMOVED.